# Patient Record
Sex: FEMALE | Race: WHITE | Employment: OTHER | URBAN - METROPOLITAN AREA
[De-identification: names, ages, dates, MRNs, and addresses within clinical notes are randomized per-mention and may not be internally consistent; named-entity substitution may affect disease eponyms.]

---

## 2018-07-31 ENCOUNTER — TRANSFERRED RECORDS (OUTPATIENT)
Dept: HEALTH INFORMATION MANAGEMENT | Facility: CLINIC | Age: 48
End: 2018-07-31

## 2018-08-09 ENCOUNTER — TRANSFERRED RECORDS (OUTPATIENT)
Dept: HEALTH INFORMATION MANAGEMENT | Facility: CLINIC | Age: 48
End: 2018-08-09

## 2018-08-10 ENCOUNTER — TRANSFERRED RECORDS (OUTPATIENT)
Dept: HEALTH INFORMATION MANAGEMENT | Facility: CLINIC | Age: 48
End: 2018-08-10

## 2018-08-10 ENCOUNTER — APPOINTMENT (OUTPATIENT)
Dept: GENERAL RADIOLOGY | Facility: CLINIC | Age: 48
DRG: 560 | End: 2018-08-10
Attending: ORTHOPAEDIC SURGERY
Payer: COMMERCIAL

## 2018-08-10 ENCOUNTER — HOSPITAL ENCOUNTER (INPATIENT)
Facility: CLINIC | Age: 48
LOS: 2 days | Discharge: HOME | DRG: 560 | End: 2018-08-12
Attending: ORTHOPAEDIC SURGERY | Admitting: ORTHOPAEDIC SURGERY
Payer: COMMERCIAL

## 2018-08-10 DIAGNOSIS — S73.005A DISLOCATION OF LEFT HIP, INITIAL ENCOUNTER (H): Primary | ICD-10-CM

## 2018-08-10 PROBLEM — S73.006A: Status: ACTIVE | Noted: 2018-08-10

## 2018-08-10 PROCEDURE — A9270 NON-COVERED ITEM OR SERVICE: HCPCS | Mod: GY | Performed by: STUDENT IN AN ORGANIZED HEALTH CARE EDUCATION/TRAINING PROGRAM

## 2018-08-10 PROCEDURE — 25000132 ZZH RX MED GY IP 250 OP 250 PS 637: Performed by: STUDENT IN AN ORGANIZED HEALTH CARE EDUCATION/TRAINING PROGRAM

## 2018-08-10 PROCEDURE — 25000128 H RX IP 250 OP 636: Performed by: STUDENT IN AN ORGANIZED HEALTH CARE EDUCATION/TRAINING PROGRAM

## 2018-08-10 PROCEDURE — 85027 COMPLETE CBC AUTOMATED: CPT | Performed by: STUDENT IN AN ORGANIZED HEALTH CARE EDUCATION/TRAINING PROGRAM

## 2018-08-10 PROCEDURE — 81025 URINE PREGNANCY TEST: CPT | Performed by: STUDENT IN AN ORGANIZED HEALTH CARE EDUCATION/TRAINING PROGRAM

## 2018-08-10 PROCEDURE — 36415 COLL VENOUS BLD VENIPUNCTURE: CPT | Performed by: STUDENT IN AN ORGANIZED HEALTH CARE EDUCATION/TRAINING PROGRAM

## 2018-08-10 PROCEDURE — 72170 X-RAY EXAM OF PELVIS: CPT

## 2018-08-10 PROCEDURE — 12000001 ZZH R&B MED SURG/OB UMMC

## 2018-08-10 RX ORDER — OXYCODONE HYDROCHLORIDE 5 MG/1
5-10 TABLET ORAL
Status: DISCONTINUED | OUTPATIENT
Start: 2018-08-10 | End: 2018-08-12 | Stop reason: HOSPADM

## 2018-08-10 RX ORDER — PROCHLORPERAZINE MALEATE 5 MG
10 TABLET ORAL EVERY 6 HOURS PRN
Status: DISCONTINUED | OUTPATIENT
Start: 2018-08-10 | End: 2018-08-12 | Stop reason: HOSPADM

## 2018-08-10 RX ORDER — ACETAMINOPHEN 325 MG/1
975 TABLET ORAL EVERY 8 HOURS
Status: DISCONTINUED | OUTPATIENT
Start: 2018-08-10 | End: 2018-08-12 | Stop reason: HOSPADM

## 2018-08-10 RX ORDER — DIAZEPAM 5 MG
5 TABLET ORAL EVERY 6 HOURS PRN
Status: DISCONTINUED | OUTPATIENT
Start: 2018-08-10 | End: 2018-08-12 | Stop reason: HOSPADM

## 2018-08-10 RX ORDER — KETOROLAC TROMETHAMINE 15 MG/ML
15 INJECTION, SOLUTION INTRAMUSCULAR; INTRAVENOUS EVERY 6 HOURS
Status: DISPENSED | OUTPATIENT
Start: 2018-08-10 | End: 2018-08-12

## 2018-08-10 RX ORDER — LIDOCAINE 40 MG/G
CREAM TOPICAL
Status: DISCONTINUED | OUTPATIENT
Start: 2018-08-10 | End: 2018-08-12 | Stop reason: HOSPADM

## 2018-08-10 RX ORDER — HYDROMORPHONE HYDROCHLORIDE 1 MG/ML
.3-.5 INJECTION, SOLUTION INTRAMUSCULAR; INTRAVENOUS; SUBCUTANEOUS
Status: DISCONTINUED | OUTPATIENT
Start: 2018-08-10 | End: 2018-08-12 | Stop reason: HOSPADM

## 2018-08-10 RX ORDER — ACETAMINOPHEN 325 MG/1
650 TABLET ORAL EVERY 4 HOURS PRN
Status: DISCONTINUED | OUTPATIENT
Start: 2018-08-13 | End: 2018-08-12 | Stop reason: HOSPADM

## 2018-08-10 RX ORDER — ONDANSETRON 4 MG/1
4 TABLET, ORALLY DISINTEGRATING ORAL EVERY 6 HOURS PRN
Status: DISCONTINUED | OUTPATIENT
Start: 2018-08-10 | End: 2018-08-12 | Stop reason: HOSPADM

## 2018-08-10 RX ORDER — HYDROXYZINE HYDROCHLORIDE 25 MG/1
25-50 TABLET, FILM COATED ORAL EVERY 6 HOURS PRN
Status: DISCONTINUED | OUTPATIENT
Start: 2018-08-10 | End: 2018-08-12 | Stop reason: HOSPADM

## 2018-08-10 RX ORDER — SODIUM CHLORIDE 9 MG/ML
INJECTION, SOLUTION INTRAVENOUS CONTINUOUS
Status: DISCONTINUED | OUTPATIENT
Start: 2018-08-10 | End: 2018-08-12 | Stop reason: HOSPADM

## 2018-08-10 RX ORDER — ONDANSETRON 2 MG/ML
4 INJECTION INTRAMUSCULAR; INTRAVENOUS EVERY 6 HOURS PRN
Status: DISCONTINUED | OUTPATIENT
Start: 2018-08-10 | End: 2018-08-12 | Stop reason: HOSPADM

## 2018-08-10 RX ORDER — CALCIUM CARBONATE 500 MG/1
1000 TABLET, CHEWABLE ORAL 4 TIMES DAILY PRN
Status: DISCONTINUED | OUTPATIENT
Start: 2018-08-10 | End: 2018-08-12 | Stop reason: HOSPADM

## 2018-08-10 RX ORDER — NALOXONE HYDROCHLORIDE 0.4 MG/ML
.1-.4 INJECTION, SOLUTION INTRAMUSCULAR; INTRAVENOUS; SUBCUTANEOUS
Status: DISCONTINUED | OUTPATIENT
Start: 2018-08-10 | End: 2018-08-12 | Stop reason: HOSPADM

## 2018-08-10 RX ADMIN — OXYCODONE HYDROCHLORIDE 5 MG: 5 TABLET ORAL at 23:34

## 2018-08-10 RX ADMIN — ACETAMINOPHEN 975 MG: 325 TABLET, FILM COATED ORAL at 20:21

## 2018-08-10 RX ADMIN — KETOROLAC TROMETHAMINE 15 MG: 15 INJECTION, SOLUTION INTRAMUSCULAR; INTRAVENOUS at 22:04

## 2018-08-10 RX ADMIN — HYDROMORPHONE HYDROCHLORIDE 0.5 MG: 1 INJECTION, SOLUTION INTRAMUSCULAR; INTRAVENOUS; SUBCUTANEOUS at 20:06

## 2018-08-10 ASSESSMENT — ACTIVITIES OF DAILY LIVING (ADL): ADLS_ACUITY_SCORE: 18

## 2018-08-10 NOTE — IP AVS SNAPSHOT
UR 8A    [address] [facility]    [facility] [address]    Phone:  [phone]                                       After Visit Summary   8/10/2018    Paulina Fonseca    MRN: [id number]           After Visit Summary Signature Page     I have received my discharge instructions, and my questions have been answered. I have discussed any challenges I see with this plan with the nurse or doctor.    ..........................................................................................................................................  Patient/Patient Representative Signature      ..........................................................................................................................................  Patient Representative Print Name and Relationship to Patient    ..................................................               ................................................  Date                                            Time    ..........................................................................................................................................  Reviewed by Signature/Title    ...................................................              ..............................................  Date                                                            Time

## 2018-08-10 NOTE — IP AVS SNAPSHOT
MRN:[id number]                      After Visit Summary   8/10/2018    Paulina Fonseca    MRN: [id number]           Thank you!     Thank you for choosing [facility] for your care. Our goal is always to provide you with excellent care. Hearing back from our patients is one way we can continue to improve our services. Please take a few minutes to complete the written survey that you may receive in the mail after you visit with us. Thank you!        Patient Information     Date Of Birth          1970        About your hospital stay     You were admitted on:  August 10, 2018 You last received care in the:  [facility] 8A    You were discharged on:  August 12, 2018        Reason for your hospital stay       Hip dislocation                  Who to Call     For medical emergencies, please call 911.  For non-urgent questions about your medical care, please call your primary care provider or clinic, None  For questions related to your surgery, please call your surgery clinic        Attending Provider     Provider Specialty    Kosta Pastrana MD Orthopedics       Primary Care Provider    None Specified      After Care Instructions     Activity       Your activity upon discharge: WBAT with assistive device; discuss with your primary surgeon what their recommendations would be for hip precautions.            Diet       Follow this diet upon discharge: Orders Placed This Encounter      Advance Diet as Tolerated: Regular Diet Adult                  Follow-up Appointments     Follow Up and recommended labs and tests       Contact your primary surgeon to plan your next follow up appointment.                  Additional Information     If you use hormonal birth control (such as the pill, patch, ring or implants): You'll need a second form of birth control for 7 days (condoms, a diaphragm or contraceptive foam). While in the hospital, you received a medicine called Bridion. Your normal birth control will not work as  "well for a week after taking this medicine.          Pending Results     Date and Time Order Name Status Description    2018 0600 EKG 12-lead, complete Preliminary             Statement of Approval     Ordered          18 1227  I have reviewed and agree with all the recommendations and orders detailed in this document.  EFFECTIVE NOW     Approved and electronically signed by:  Kosta Pastrana MD             Admission Information     Date & Time Provider Department Dept. Phone    8/10/2018 Kosta Pastrana MD UR 8A [phone]      Your Vitals Were     Blood Pressure Pulse Temperature Respirations Height Weight    106/59 (BP Location: Right arm) 72 98.4  F (36.9  C) (Oral) 16 1.539 m (5' 0.6\") 68 kg (150 lb)    Pulse Oximetry BMI (Body Mass Index)                94% 28.72 kg/m2          MyChart Information     [employer] lets you send messages to your doctor, view your test results, renew your prescriptions, schedule appointments and more. To sign up, go to [email or web address] . Click on \"Log in\" on the left side of the screen, which will take you to the Welcome page. Then click on \"Sign up Now\" on the right side of the page.     You will be asked to enter the access code listed below, as well as some personal information. Please follow the directions to create your username and password.     Your access code is: [email or web address]  Expires: 11/10/2018 12:20 PM     Your access code will  in 90 days. If you need help or a new code, please call your [facility] clinic or [phone].        Care EveryWhere ID     This is your Care EveryWhere ID. This could be used by other organizations to access your [facility] medical records  [id number]        Equal Access to Services     [facility]: Carmen Rock, frances dow, aura manningalmasamuel earl, sandy cleaning. So [facility] [phone].    ATENCIÓN: Si habla español, tiene a cook disposición servicios gratuitos de " asistencia lingüística. Tonja al [phone].    We comply with applicable federal civil rights laws and [place] laws. We do not discriminate on the basis of race, color, national origin, age, disability, sex, sexual orientation, or gender identity.               Review of your medicines      START taking        Dose / Directions    acetaminophen 325 MG tablet   Commonly known as:  TYLENOL        Dose:  650 mg   Start taking on:  8/13/2018   Take 2 tablets (650 mg) by mouth every 4 hours as needed for other (multimodal surgical pain management along with NSAIDS and opioid medication as indicated based on pain control and physical function.)   Quantity:  100 tablet   Refills:  0       oxyCODONE IR 5 MG tablet   Commonly known as:  ROXICODONE        Dose:  5-10 mg   Take 1-2 tablets (5-10 mg) by mouth every 4 hours as needed for other (pain control or improvement in physical function. Hold dose for analgesic side effects.)   Quantity:  40 tablet   Refills:  0         CONTINUE these medicines which have NOT CHANGED        Dose / Directions    DULOXETINE HCL PO        Dose:  60 mg   Take 60 mg by mouth daily   Refills:  0       * GABAPENTIN PO        Dose:  600 mg   Take 600 mg by mouth 2 times daily AM and Lunch time   Refills:  0       * GABAPENTIN PO        Dose:  900 mg   Take 900 mg by mouth At Bedtime   Refills:  0       MODAFINIL PO        Dose:  250 mg   Take 250 mg by mouth every morning   Refills:  0       PROTONIX PO        Dose:  40 mg   Take 40 mg by mouth every morning (before breakfast)   Refills:  0       REGLAN PO        Dose:  10 mg   Take 10 mg by mouth 3 times daily (before meals)   Refills:  0       ROPINIROLE HCL PO        Dose:  2 mg   Take 2 mg by mouth 3 times daily   Refills:  0       TIZANIDINE HCL PO        Dose:  4 mg   Take 4 mg by mouth every 8 hours as needed for muscle spasms   Refills:  0       * Notice:  This list has 2 medication(s) that are the same as other medications prescribed  for you. Read the directions carefully, and ask your doctor or other care provider to review them with you.      STOP taking     gabapentin 300 MG 24 hr tablet   Commonly known as:  GRALISE                Where to get your medicines      Some of these will need a paper prescription and others can be bought over the counter. Ask your nurse if you have questions.     Bring a paper prescription for each of these medications     acetaminophen 325 MG tablet    oxyCODONE IR 5 MG tablet                Protect others around you: Learn how to safely use, store and throw away your medicines at www.disposemymeds.org.        Information about OPIOIDS     PRESCRIPTION OPIOIDS: WHAT YOU NEED TO KNOW   We gave you an opioid (narcotic) pain medicine. It is important to manage your pain, but opioids are not always the best choice. You should first try all the other options your care team gave you. Take this medicine for as short a time (and as few doses) as possible.    Some activities can increase your pain, such as bandage changes or therapy sessions. It may help to take your pain medicine 30 to 60 minutes before these activities. Reduce your stress by getting enough sleep, working on hobbies you enjoy and practicing relaxation or meditation. Talk to your care team about ways to manage your pain beyond prescription opioids.    These medicines have risks:    DO NOT drive when on new or higher doses of pain medicine. These medicines can affect your alertness and reaction times, and you could be arrested for driving under the influence (DUI). If you need to use opioids long-term, talk to your care team about driving.    DO NOT operate heavy machinery    DO NOT do any other dangerous activities while taking these medicines.    DO NOT drink any alcohol while taking these medicines.     If the opioid prescribed includes acetaminophen, DO NOT take with any other medicines that contain acetaminophen. Read all labels carefully. Look for  the word  acetaminophen  or  Tylenol.  Ask your pharmacist if you have questions or are unsure.    You can get addicted to pain medicines, especially if you have a history of addiction (chemical, alcohol or substance dependence). Talk to your care team about ways to reduce this risk.    All opioids tend to cause constipation. Drink plenty of water and eat foods that have a lot of fiber, such as fruits, vegetables, prune juice, apple juice and high-fiber cereal. Take a laxative (Miralax, milk of magnesia, Colace, Senna) if you don t move your bowels at least every other day. Other side effects include upset stomach, sleepiness, dizziness, throwing up, tolerance (needing more of the medicine to have the same effect), physical dependence and slowed breathing.    Store your pills in a secure place, locked if possible. We will not replace any lost or stolen medicine. If you don t finish your medicine, please throw away (dispose) as directed by your pharmacist. The [place] Pollution Control [employer] has more information about safe disposal: https://www.pca.[facility].us/living-green/managing-unwanted-medications             Medication List: This is a list of all your medications and when to take them. Check marks below indicate your daily home schedule. Keep this list as a reference.      Medications           Morning Afternoon Evening Bedtime As Needed    acetaminophen 325 MG tablet   Commonly known as:  TYLENOL   Take 2 tablets (650 mg) by mouth every 4 hours as needed for other (multimodal surgical pain management along with NSAIDS and opioid medication as indicated based on pain control and physical function.)   Start taking on:  8/13/2018   Last time this was given:  975 mg on 8/12/2018  2:39 PM                                DULOXETINE HCL PO   Take 60 mg by mouth daily   Last time this was given:  60 mg on 8/12/2018  9:22 AM                                * GABAPENTIN PO   Take 600 mg by mouth 2 times daily AM and  Lunch time   Last time this was given:  600 mg on 8/12/2018  2:39 PM                                * GABAPENTIN PO   Take 900 mg by mouth At Bedtime   Last time this was given:  600 mg on 8/12/2018  2:39 PM                                MODAFINIL PO   Take 250 mg by mouth every morning   Last time this was given:  250 mg on 8/12/2018  9:22 AM                                oxyCODONE IR 5 MG tablet   Commonly known as:  ROXICODONE   Take 1-2 tablets (5-10 mg) by mouth every 4 hours as needed for other (pain control or improvement in physical function. Hold dose for analgesic side effects.)   Last time this was given:  10 mg on 8/12/2018  3:19 PM                                PROTONIX PO   Take 40 mg by mouth every morning (before breakfast)   Last time this was given:  40 mg on 8/12/2018  9:22 AM                                REGLAN PO   Take 10 mg by mouth 3 times daily (before meals)   Last time this was given:  10 mg on 8/12/2018 12:31 PM                                ROPINIROLE HCL PO   Take 2 mg by mouth 3 times daily   Last time this was given:  2 mg on 8/12/2018  9:22 AM                                TIZANIDINE HCL PO   Take 4 mg by mouth every 8 hours as needed for muscle spasms                                * Notice:  This list has 2 medication(s) that are the same as other medications prescribed for you. Read the directions carefully, and ask your doctor or other care provider to review them with you.

## 2018-08-11 ENCOUNTER — APPOINTMENT (OUTPATIENT)
Dept: GENERAL RADIOLOGY | Facility: CLINIC | Age: 48
DRG: 560 | End: 2018-08-11
Attending: ORTHOPAEDIC SURGERY
Payer: COMMERCIAL

## 2018-08-11 ENCOUNTER — ANESTHESIA (OUTPATIENT)
Dept: SURGERY | Facility: CLINIC | Age: 48
DRG: 560 | End: 2018-08-11
Payer: COMMERCIAL

## 2018-08-11 ENCOUNTER — ANESTHESIA EVENT (OUTPATIENT)
Dept: SURGERY | Facility: CLINIC | Age: 48
DRG: 560 | End: 2018-08-11
Payer: COMMERCIAL

## 2018-08-11 LAB
ABO + RH BLD: NORMAL
ANION GAP SERPL CALCULATED.3IONS-SCNC: 5 MMOL/L (ref 3–14)
BLD GP AB SCN SERPL QL: NORMAL
BLOOD BANK CMNT PATIENT-IMP: NORMAL
BUN SERPL-MCNC: 17 MG/DL (ref 7–30)
CALCIUM SERPL-MCNC: 7.9 MG/DL (ref 8.5–10.1)
CHLORIDE SERPL-SCNC: 114 MMOL/L (ref 94–109)
CO2 SERPL-SCNC: 27 MMOL/L (ref 20–32)
CREAT SERPL-MCNC: 0.54 MG/DL (ref 0.52–1.04)
ERYTHROCYTE [DISTWIDTH] IN BLOOD BY AUTOMATED COUNT: 23.6 % (ref 10–15)
GFR SERPL CREATININE-BSD FRML MDRD: >90 ML/MIN/1.7M2
GLUCOSE SERPL-MCNC: 83 MG/DL (ref 70–99)
HCG UR QL: NEGATIVE
HCT VFR BLD AUTO: 32.7 % (ref 35–47)
HGB BLD-MCNC: 9.1 G/DL (ref 11.7–15.7)
HGB BLD-MCNC: 9.6 G/DL (ref 11.7–15.7)
INR PPP: 0.97 (ref 0.86–1.14)
INTERNAL QC OK POCT: YES
MCH RBC QN AUTO: 22.6 PG (ref 26.5–33)
MCHC RBC AUTO-ENTMCNC: 27.8 G/DL (ref 31.5–36.5)
MCV RBC AUTO: 81 FL (ref 78–100)
PLATELET # BLD AUTO: 201 10E9/L (ref 150–450)
POTASSIUM SERPL-SCNC: 4.1 MMOL/L (ref 3.4–5.3)
RBC # BLD AUTO: 4.02 10E12/L (ref 3.8–5.2)
SODIUM SERPL-SCNC: 146 MMOL/L (ref 133–144)
SPECIMEN EXP DATE BLD: NORMAL
SPECIMEN EXP DATE BLD: NORMAL
WBC # BLD AUTO: 8.5 10E9/L (ref 4–11)

## 2018-08-11 PROCEDURE — A9270 NON-COVERED ITEM OR SERVICE: HCPCS | Mod: GY | Performed by: HOSPITALIST

## 2018-08-11 PROCEDURE — 25000132 ZZH RX MED GY IP 250 OP 250 PS 637: Mod: GY | Performed by: ANESTHESIOLOGY

## 2018-08-11 PROCEDURE — 25000132 ZZH RX MED GY IP 250 OP 250 PS 637: Mod: GY | Performed by: STUDENT IN AN ORGANIZED HEALTH CARE EDUCATION/TRAINING PROGRAM

## 2018-08-11 PROCEDURE — 40000278 XR SURGERY CARM FLUORO LESS THAN 5 MIN: Mod: TC

## 2018-08-11 PROCEDURE — A9270 NON-COVERED ITEM OR SERVICE: HCPCS | Mod: GY | Performed by: STUDENT IN AN ORGANIZED HEALTH CARE EDUCATION/TRAINING PROGRAM

## 2018-08-11 PROCEDURE — 85018 HEMOGLOBIN: CPT | Performed by: STUDENT IN AN ORGANIZED HEALTH CARE EDUCATION/TRAINING PROGRAM

## 2018-08-11 PROCEDURE — A9270 NON-COVERED ITEM OR SERVICE: HCPCS | Mod: GY | Performed by: ANESTHESIOLOGY

## 2018-08-11 PROCEDURE — 93005 ELECTROCARDIOGRAM TRACING: CPT

## 2018-08-11 PROCEDURE — 80048 BASIC METABOLIC PNL TOTAL CA: CPT | Performed by: STUDENT IN AN ORGANIZED HEALTH CARE EDUCATION/TRAINING PROGRAM

## 2018-08-11 PROCEDURE — 99231 SBSQ HOSP IP/OBS SF/LOW 25: CPT | Performed by: HOSPITALIST

## 2018-08-11 PROCEDURE — 36000055 ZZH SURGERY LEVEL 2 W FLUORO 1ST 30 MIN - UMMC: Performed by: ORTHOPAEDIC SURGERY

## 2018-08-11 PROCEDURE — 36000066 ZZH SURGERY LEVEL 4 W FLUORO 1ST 30 MIN - UMMC: Performed by: ORTHOPAEDIC SURGERY

## 2018-08-11 PROCEDURE — 37000009 ZZH ANESTHESIA TECHNICAL FEE, EACH ADDTL 15 MIN: Performed by: ORTHOPAEDIC SURGERY

## 2018-08-11 PROCEDURE — 25000566 ZZH SEVOFLURANE, EA 15 MIN: Performed by: ORTHOPAEDIC SURGERY

## 2018-08-11 PROCEDURE — 12000001 ZZH R&B MED SURG/OB UMMC

## 2018-08-11 PROCEDURE — 85610 PROTHROMBIN TIME: CPT | Performed by: STUDENT IN AN ORGANIZED HEALTH CARE EDUCATION/TRAINING PROGRAM

## 2018-08-11 PROCEDURE — 86901 BLOOD TYPING SEROLOGIC RH(D): CPT | Performed by: ANESTHESIOLOGY

## 2018-08-11 PROCEDURE — 25000125 ZZHC RX 250: Performed by: STUDENT IN AN ORGANIZED HEALTH CARE EDUCATION/TRAINING PROGRAM

## 2018-08-11 PROCEDURE — 25000125 ZZHC RX 250: Performed by: NURSE ANESTHETIST, CERTIFIED REGISTERED

## 2018-08-11 PROCEDURE — 25000128 H RX IP 250 OP 636: Performed by: ANESTHESIOLOGY

## 2018-08-11 PROCEDURE — 0SWBXJZ REVISION OF SYNTHETIC SUBSTITUTE IN LEFT HIP JOINT, EXTERNAL APPROACH: ICD-10-PCS | Performed by: ORTHOPAEDIC SURGERY

## 2018-08-11 PROCEDURE — 25000128 H RX IP 250 OP 636: Performed by: STUDENT IN AN ORGANIZED HEALTH CARE EDUCATION/TRAINING PROGRAM

## 2018-08-11 PROCEDURE — 86900 BLOOD TYPING SEROLOGIC ABO: CPT | Performed by: ANESTHESIOLOGY

## 2018-08-11 PROCEDURE — 93010 ELECTROCARDIOGRAM REPORT: CPT | Performed by: INTERNAL MEDICINE

## 2018-08-11 PROCEDURE — 25000132 ZZH RX MED GY IP 250 OP 250 PS 637: Performed by: HOSPITALIST

## 2018-08-11 PROCEDURE — 86850 RBC ANTIBODY SCREEN: CPT | Performed by: ANESTHESIOLOGY

## 2018-08-11 PROCEDURE — 40000170 ZZH STATISTIC PRE-PROCEDURE ASSESSMENT II: Performed by: ORTHOPAEDIC SURGERY

## 2018-08-11 PROCEDURE — 71000014 ZZH RECOVERY PHASE 1 LEVEL 2 FIRST HR: Performed by: ORTHOPAEDIC SURGERY

## 2018-08-11 PROCEDURE — 25000128 H RX IP 250 OP 636: Performed by: NURSE ANESTHETIST, CERTIFIED REGISTERED

## 2018-08-11 PROCEDURE — 37000008 ZZH ANESTHESIA TECHNICAL FEE, 1ST 30 MIN: Performed by: ORTHOPAEDIC SURGERY

## 2018-08-11 PROCEDURE — 36415 COLL VENOUS BLD VENIPUNCTURE: CPT | Performed by: STUDENT IN AN ORGANIZED HEALTH CARE EDUCATION/TRAINING PROGRAM

## 2018-08-11 PROCEDURE — C9399 UNCLASSIFIED DRUGS OR BIOLOG: HCPCS | Performed by: NURSE ANESTHETIST, CERTIFIED REGISTERED

## 2018-08-11 RX ORDER — TIZANIDINE 2 MG/1
4 TABLET ORAL EVERY 8 HOURS PRN
Status: DISCONTINUED | OUTPATIENT
Start: 2018-08-11 | End: 2018-08-12 | Stop reason: HOSPADM

## 2018-08-11 RX ORDER — GABAPENTIN 600 MG/1
600 TABLET ORAL 3 TIMES DAILY
Status: DISCONTINUED | OUTPATIENT
Start: 2018-08-11 | End: 2018-08-12

## 2018-08-11 RX ORDER — FENTANYL CITRATE 50 UG/ML
25-50 INJECTION, SOLUTION INTRAMUSCULAR; INTRAVENOUS
Status: DISCONTINUED | OUTPATIENT
Start: 2018-08-11 | End: 2018-08-11 | Stop reason: HOSPADM

## 2018-08-11 RX ORDER — HYDROMORPHONE HYDROCHLORIDE 1 MG/ML
.3-.5 INJECTION, SOLUTION INTRAMUSCULAR; INTRAVENOUS; SUBCUTANEOUS EVERY 5 MIN PRN
Status: DISCONTINUED | OUTPATIENT
Start: 2018-08-11 | End: 2018-08-11 | Stop reason: HOSPADM

## 2018-08-11 RX ORDER — NALOXONE HYDROCHLORIDE 0.4 MG/ML
.1-.4 INJECTION, SOLUTION INTRAMUSCULAR; INTRAVENOUS; SUBCUTANEOUS
Status: ACTIVE | OUTPATIENT
Start: 2018-08-11 | End: 2018-08-12

## 2018-08-11 RX ORDER — METOCLOPRAMIDE 10 MG/1
10 TABLET ORAL
Status: DISCONTINUED | OUTPATIENT
Start: 2018-08-12 | End: 2018-08-11

## 2018-08-11 RX ORDER — SODIUM CHLORIDE, SODIUM LACTATE, POTASSIUM CHLORIDE, CALCIUM CHLORIDE 600; 310; 30; 20 MG/100ML; MG/100ML; MG/100ML; MG/100ML
INJECTION, SOLUTION INTRAVENOUS CONTINUOUS
Status: DISCONTINUED | OUTPATIENT
Start: 2018-08-11 | End: 2018-08-11 | Stop reason: HOSPADM

## 2018-08-11 RX ORDER — GABAPENTIN 600 MG/1
600 TABLET, FILM COATED ORAL 2 TIMES DAILY
Status: DISCONTINUED | OUTPATIENT
Start: 2018-08-11 | End: 2018-08-11

## 2018-08-11 RX ORDER — GABAPENTIN 300 MG/1
900 CAPSULE ORAL ONCE
Status: COMPLETED | OUTPATIENT
Start: 2018-08-11 | End: 2018-08-11

## 2018-08-11 RX ORDER — ROPINIROLE 2 MG/1
2 TABLET, FILM COATED ORAL 3 TIMES DAILY
Status: DISCONTINUED | OUTPATIENT
Start: 2018-08-11 | End: 2018-08-12 | Stop reason: HOSPADM

## 2018-08-11 RX ORDER — LIDOCAINE HYDROCHLORIDE 20 MG/ML
INJECTION, SOLUTION INFILTRATION; PERINEURAL PRN
Status: DISCONTINUED | OUTPATIENT
Start: 2018-08-11 | End: 2018-08-11

## 2018-08-11 RX ORDER — PANTOPRAZOLE SODIUM 40 MG/1
40 TABLET, DELAYED RELEASE ORAL
Status: DISCONTINUED | OUTPATIENT
Start: 2018-08-12 | End: 2018-08-12 | Stop reason: HOSPADM

## 2018-08-11 RX ORDER — DULOXETIN HYDROCHLORIDE 60 MG/1
60 CAPSULE, DELAYED RELEASE ORAL DAILY
Status: DISCONTINUED | OUTPATIENT
Start: 2018-08-12 | End: 2018-08-12 | Stop reason: HOSPADM

## 2018-08-11 RX ORDER — FENTANYL CITRATE 50 UG/ML
INJECTION, SOLUTION INTRAMUSCULAR; INTRAVENOUS PRN
Status: DISCONTINUED | OUTPATIENT
Start: 2018-08-11 | End: 2018-08-11

## 2018-08-11 RX ORDER — ONDANSETRON 4 MG/1
4 TABLET, ORALLY DISINTEGRATING ORAL EVERY 30 MIN PRN
Status: DISCONTINUED | OUTPATIENT
Start: 2018-08-11 | End: 2018-08-11 | Stop reason: HOSPADM

## 2018-08-11 RX ORDER — PROPOFOL 10 MG/ML
INJECTION, EMULSION INTRAVENOUS PRN
Status: DISCONTINUED | OUTPATIENT
Start: 2018-08-11 | End: 2018-08-11

## 2018-08-11 RX ORDER — GABAPENTIN 300 MG/1
600 TABLET, FILM COATED ORAL 2 TIMES DAILY
Status: ON HOLD | COMMUNITY
End: 2018-08-12

## 2018-08-11 RX ORDER — ONDANSETRON 2 MG/ML
4 INJECTION INTRAMUSCULAR; INTRAVENOUS EVERY 30 MIN PRN
Status: DISCONTINUED | OUTPATIENT
Start: 2018-08-11 | End: 2018-08-11 | Stop reason: HOSPADM

## 2018-08-11 RX ORDER — SODIUM CHLORIDE, SODIUM LACTATE, POTASSIUM CHLORIDE, CALCIUM CHLORIDE 600; 310; 30; 20 MG/100ML; MG/100ML; MG/100ML; MG/100ML
INJECTION, SOLUTION INTRAVENOUS CONTINUOUS PRN
Status: DISCONTINUED | OUTPATIENT
Start: 2018-08-11 | End: 2018-08-11

## 2018-08-11 RX ORDER — METOCLOPRAMIDE 10 MG/1
10 TABLET ORAL
Status: DISCONTINUED | OUTPATIENT
Start: 2018-08-11 | End: 2018-08-12 | Stop reason: HOSPADM

## 2018-08-11 RX ADMIN — Medication 0.3 MG: at 09:08

## 2018-08-11 RX ADMIN — HYDROMORPHONE HYDROCHLORIDE 0.5 MG: 1 INJECTION, SOLUTION INTRAMUSCULAR; INTRAVENOUS; SUBCUTANEOUS at 06:10

## 2018-08-11 RX ADMIN — KETOROLAC TROMETHAMINE 15 MG: 15 INJECTION, SOLUTION INTRAMUSCULAR; INTRAVENOUS at 23:57

## 2018-08-11 RX ADMIN — GABAPENTIN 600 MG: 600 TABLET, FILM COATED ORAL at 23:56

## 2018-08-11 RX ADMIN — ACETAMINOPHEN 975 MG: 325 TABLET, FILM COATED ORAL at 20:52

## 2018-08-11 RX ADMIN — OXYCODONE HYDROCHLORIDE 5 MG: 5 TABLET ORAL at 13:15

## 2018-08-11 RX ADMIN — SODIUM CHLORIDE, POTASSIUM CHLORIDE, SODIUM LACTATE AND CALCIUM CHLORIDE: 600; 310; 30; 20 INJECTION, SOLUTION INTRAVENOUS at 07:59

## 2018-08-11 RX ADMIN — LIDOCAINE HYDROCHLORIDE 100 MG: 20 INJECTION, SOLUTION INFILTRATION; PERINEURAL at 08:06

## 2018-08-11 RX ADMIN — ROCURONIUM BROMIDE 50 MG: 10 INJECTION INTRAVENOUS at 08:06

## 2018-08-11 RX ADMIN — ACETAMINOPHEN 975 MG: 325 TABLET, FILM COATED ORAL at 03:50

## 2018-08-11 RX ADMIN — Medication 0.5 MG: at 08:43

## 2018-08-11 RX ADMIN — GABAPENTIN 900 MG: 300 CAPSULE ORAL at 07:47

## 2018-08-11 RX ADMIN — SUGAMMADEX 270 MG: 100 INJECTION, SOLUTION INTRAVENOUS at 08:25

## 2018-08-11 RX ADMIN — OXYCODONE HYDROCHLORIDE 10 MG: 5 TABLET ORAL at 20:52

## 2018-08-11 RX ADMIN — MIDAZOLAM 2 MG: 1 INJECTION INTRAMUSCULAR; INTRAVENOUS at 07:59

## 2018-08-11 RX ADMIN — ACETAMINOPHEN 975 MG: 325 TABLET, FILM COATED ORAL at 13:15

## 2018-08-11 RX ADMIN — ROPINIROLE HYDROCHLORIDE 2 MG: 2 TABLET, FILM COATED ORAL at 21:34

## 2018-08-11 RX ADMIN — OXYCODONE HYDROCHLORIDE 5 MG: 5 TABLET ORAL at 03:50

## 2018-08-11 RX ADMIN — OXYCODONE HYDROCHLORIDE 10 MG: 5 TABLET ORAL at 17:16

## 2018-08-11 RX ADMIN — OXYCODONE HYDROCHLORIDE 10 MG: 5 TABLET ORAL at 23:56

## 2018-08-11 RX ADMIN — FENTANYL CITRATE 100 MCG: 50 INJECTION, SOLUTION INTRAMUSCULAR; INTRAVENOUS at 08:06

## 2018-08-11 RX ADMIN — PROPOFOL 150 MG: 10 INJECTION, EMULSION INTRAVENOUS at 08:06

## 2018-08-11 RX ADMIN — ONDANSETRON 4 MG: 2 INJECTION INTRAMUSCULAR; INTRAVENOUS at 08:30

## 2018-08-11 RX ADMIN — KETOROLAC TROMETHAMINE 15 MG: 15 INJECTION, SOLUTION INTRAMUSCULAR; INTRAVENOUS at 03:50

## 2018-08-11 RX ADMIN — ONDANSETRON 4 MG: 4 TABLET, ORALLY DISINTEGRATING ORAL at 21:34

## 2018-08-11 RX ADMIN — KETOROLAC TROMETHAMINE 15 MG: 15 INJECTION, SOLUTION INTRAMUSCULAR; INTRAVENOUS at 17:20

## 2018-08-11 ASSESSMENT — ACTIVITIES OF DAILY LIVING (ADL)
ADLS_ACUITY_SCORE: 16
ADLS_ACUITY_SCORE: 18
ADLS_ACUITY_SCORE: 18
ADLS_ACUITY_SCORE: 16
ADLS_ACUITY_SCORE: 17

## 2018-08-11 ASSESSMENT — LIFESTYLE VARIABLES: TOBACCO_USE: 0

## 2018-08-11 NOTE — ANESTHESIA CARE TRANSFER NOTE
Patient: Paulina Fonseca    Procedure(s):  Open Versus Closed Left Hip Reduction - Wound Class: I-Clean    Diagnosis: Left Hip Replacement Dislocation  Diagnosis Additional Information: No value filed.    Anesthesia Type:   General, LMA     Note:      Comments: RR 20, Temp 37.1,       Vitals: (Last set prior to Anesthesia Care Transfer)    CRNA VITALS  8/11/2018 0802 - 8/11/2018 0836      8/11/2018             Pulse: 79    Ht Rate: 79    SpO2: 99 %    Resp Rate (observed): (!)  7    EKG: Sinus rhythm                Electronically Signed By: ELIZABETH Pineda CRNA  August 11, 2018  8:36 AM

## 2018-08-11 NOTE — PROGRESS NOTES
[facility], [place]    Hospitalist Progress Note    Date of Service (when I saw the patient): 08/11/2018    Assessment & Plan     Prosthetic Hip Dislocation SP closed reduction in OR under general anesthesia. Doing well. Plan per ortho     Restless leg syndrome  -  Resume home medications once verified (Ropinirole and Reglan for nausea)  -  Obtain iron studies  Chronic pain  - continue neurontin, cymbalta, tizanidine  - PT/OT consult obtained by primary service     Continue Modafinil as before. Not sure why she takes that.     DVT Prophylaxis: Defer to primary service  Code Status: Full Code    Disposition: tomorrow if does okay with PT.    Valeria Smalls MD    Interval History   Doing well, pain controlled. No chest pain or sob.    -Data reviewed today: I reviewed all new labs and imaging results over the last 24 hours. I personally reviewed no images or EKG's today.    Physical Exam   Temp: 97.5  F (36.4  C) Temp src: Oral BP: 99/53 Pulse: 72 Heart Rate: 76 Resp: 18 SpO2: 94 % O2 Device: Nasal cannula Oxygen Delivery: 2 LPM  Vitals:    08/11/18 0710   Weight: 68 kg (150 lb)     Vital Signs with Ranges  Temp:  [97.5  F (36.4  C)-98.8  F (37.1  C)] 97.5  F (36.4  C)  Pulse:  [62-78] 72  Heart Rate:  [73-76] 76  Resp:  [13-23] 18  BP: ()/(45-71) 99/53  SpO2:  [91 %-99 %] 94 %  I/O last 3 completed shifts:  In: 287 [I.V.:287]  Out: 660 [Urine:660]    Constitutional: Awake, alert, cooperative, no apparent distress  Respiratory: Clear to auscultation bilaterally, no crackles or wheezing  Cardiovascular: Regular rate and rhythm, normal S1 and S2, and no murmur noted  GI: Normal bowel sounds, soft, non-distended, non-tender  Skin/Integumen: No rashes, no cyanosis, no edema      Medications     sodium chloride         acetaminophen  975 mg Oral Q8H     ketorolac  15 mg Intravenous Q6H     sodium chloride (PF)  3 mL Intracatheter Q8H       Data     Recent Labs  Lab 08/11/18  0570  08/10/18  2352   WBC  --  8.5   HGB 9.6* 9.1*   MCV  --  81   PLT  --  201   INR 0.97  --    *  --    POTASSIUM 4.1  --    CHLORIDE 114*  --    CO2 27  --    BUN 17  --    CR 0.54  --    ANIONGAP 5  --    MARK 7.9*  --    GLC 83  --        Recent Results (from the past 24 hour(s))   XR Pelvis Port 1/2 Views    Narrative    History: Hip dislocation.    COMPARISON: None.    FINDINGS: There is dislocation of the left total hip arthroplasty with  cephalad displacement of the proximal femur and femoral component. No  acute fracture, right hip arthroplasty dislocation or other radiopaque  foreign body about the pelvis and visualized hips. Bones are  osteopenic.      Impression    IMPRESSION: Dislocated left total hip arthroplasty.    LUKE COMBS MD   XR Surgery LORENZO Fluoro L/T 5 Min    Narrative    This exam was marked as non-reportable because it will not be read by a   radiologist or a [facility] non-radiologist provider.

## 2018-08-11 NOTE — PLAN OF CARE
Problem: Pain, Acute (Adult)  Goal: Identify Related Risk Factors and Signs and Symptoms  Related risk factors and signs and symptoms are identified upon initiation of Human Response Clinical Practice Guideline (CPG).   Outcome: No Change    VS: VSS   O2: >90% on RA   Output: Pt voiding via lopez catheter, dark and concentrated, odorous.    Last BM: LBM 8/9 per pt report, passing flatus and BS active.    Activity: NWB on LLE, bedrest until surgery. Abduction pillow to remain in place.    Skin: Visible skin intact, refused to turn for full skin assessment. Bruises noted.    Pain: Pain is being managed with 1x dose of IV dilaudid when pt got XRAY of pelvis/hip. Also given scheduled toradol. Oxycodone 5-10 mg Q3H and atarax 25-50 mg available for pt. Also given scheduled tylenol. Pt reports sharp constant pain in L hip and reports pain in chest from compressions.    CMS: CMS intact, denies N/T in all extremities. DP +2 bilaterally, able to wiggle toes.    Dressing: N/A   Diet: Ate PB toast, applesauce, and lizette crackers without N/V. Will be NPO at [place] for possible surgery tomorrow.    LDA: PIV WDL and SL, added extension to existing PIV   Equipment: Abduction pillow, personal belongings at bedside.    Plan: Continue to monitor patient and manage pain. Plan for possible surgery tomorrow.   Additional Info: Internal medicine still needs to see pt for medical history and clearance for pre-op.   Pre-op checklist started in anticipation for surgery tomorrow.   Pt denies suicidal ideations, states she feels safe and that this was not a suicide attempt.

## 2018-08-11 NOTE — PHARMACY-ADMISSION MEDICATION HISTORY
Admission medication history interview status for the 8/10/2018 admission is complete.   See Epic admission navigator for allergy information, pharmacy, prior to admission medications and immunization status.     Medication history interview sources:    Lesley Hale. [place]  Tel #: [phone]  The patient was able to confirm that she was indeed taking the medications as told to writer by the pharmacy.      Patient Medication Schedule Preference  The patient takes metroclopromide at the same time as her Tizanidine dose    Patient Supplied Medications  None-all her meds are formulary    Additional medication history information (including reliability of information, actions taken by pharmacist):  -Protonix and Duloxetine are new prescriptions for the patient. First dispensed on 8/7/18 per her pharmacy        Prior to Admission medications    Medication Sig Last Dose Taking? Auth Provider   DULOXETINE HCL PO Take 60 mg by mouth daily Past Week at Unknown time Yes Unknown, Entered By History   GABAPENTIN PO Take 600 mg by mouth 2 times daily AM and Lunch time Past Week at Unknown time Yes Unknown, Entered By History   Metoclopramide HCl (REGLAN PO) Take 10 mg by mouth 3 times daily (before meals) Past Week at Unknown time Yes Unknown, Entered By History   MODAFINIL PO Take 250 mg by mouth every morning Past Week at Unknown time Yes Unknown, Entered By History   Pantoprazole Sodium (PROTONIX PO) Take 40 mg by mouth every morning (before breakfast) Past Week at Unknown time Yes Unknown, Entered By History   TIZANIDINE HCL PO Take 4 mg by mouth every 8 hours as needed for muscle spasms Past Week at Unknown time Yes Unknown, Entered By History   gabapentin (GRALISE) 300 MG 24 hr tablet Take 600 mg by mouth 2 times daily AM and Lunch time Past Week at Unknown time Yes Unknown, Entered By History   ROPINIROLE HCL PO Take 2 mg by mouth 3 times daily Past Week at Unknown time Yes Unknown, Entered By History          Medication history completed by:  Flori MurdockD,BCPS  August 11, 2018

## 2018-08-11 NOTE — PLAN OF CARE
Problem: Patient Care Overview  Goal: Plan of Care/Patient Progress Review  Outcome: Improving   Pt came back from surgery about 09:45 am,A/Ox's 4. Pain rated manageable with discomfort, oxycodone po PRN given for pain control. Dressing CDI. CMS intact. Tolerated regular diet. Denied any nausea, CP, SOB, lightheadedness or dizziness, lopez patent.Encouraged increased/continued IS use. Bilateral heels elevated of bed. Ice maintained to hip. Resting in bed at this time with call light in reach. Able to make needs known. Continue to monitor.      Up to BSC and bathroom with assist of one and walker large BM x one this evening.

## 2018-08-11 NOTE — ANESTHESIA POSTPROCEDURE EVALUATION
Patient: Paulina Fonseca    Procedure(s):  Open Versus Closed Left Hip Reduction - Wound Class: I-Clean    Diagnosis:Left Hip Replacement Dislocation  Diagnosis Additional Information: No value filed.    Anesthesia Type:  General, LMA    Note:  Anesthesia Post Evaluation    Patient location during evaluation: Phase 2  Patient participation: Able to fully participate in evaluation  Level of consciousness: awake and alert  Pain management: adequate  Airway patency: patent  Cardiovascular status: acceptable  Respiratory status: acceptable  Hydration status: acceptable  PONV: none     Anesthetic complications: None          Last vitals:  Vitals:    08/11/18 0920 08/11/18 0948 08/11/18 1000   BP:  101/56 100/45   Pulse:  66 62   Resp:  16 18   Temp:  36.4  C (97.5  F)    SpO2: 98%           Electronically Signed By: Justin Ferrari DO  August 11, 2018  11:28 AM

## 2018-08-11 NOTE — PROGRESS NOTES
Orthopaedic Surgery Progress Note  August 11, 2018    Subjective: No acute events overnight. Pain controlled.    Objective: /68 (BP Location: Right arm)  Pulse 75  Temp 97.9  F (36.6  C) (Oral)  Resp 16  SpO2 99%    Physical Exam:  General: alert, no distress  Respiratory: Breathing not labored.  MSK:  Focused examination of:   LLE: Toes WWP with BCR, + DP pulse. Fires EHL/FHL/GSC/TA. SILT SP/DP/Sa/Ruff/T.      Recent Labs  Lab 08/11/18  0532 08/10/18  2352   HGB 9.6* 9.1*   WBC  --  8.5     All cultures:  No results for input(s): CULT in the last 168 hours.    Assessment:  Paulina Fonseca is a 48 year old female with a PMH of  Sticklers Syndrome, who sustained left periprosthetic hip dislocation on 8/9. Hip was unable to be reduced at an outside hospital so the patient was transferred here.     Orthopaedic Injuries:  1. Left dislocation periprosthetic hip     Anticipated Procedure: closed vs open KAILEY                          -Consent: pending                          -Pre-op labs: done                          -Medicine clearance: pending                          -OR orders: done     Orthopaedics Primary; medicine co-managing  Activity: bedrest  Weight bearing status: NWB LLE  Antibiotics: none at this time  Diet: NPO  DVT prophylaxis: none at this time  Bracing/Splinting: abduction pillow  Drains: none  Physical Therapy/Occupational Therapy: Eval and treat  Consults: medicine     Follow-up: pending clinical course     Disposition: pending clinical course    Monroe Mims MD  Orthopaedic Resident, PGY-4  Pager: [phone]

## 2018-08-11 NOTE — BRIEF OP NOTE
[facility], [place]    Brief Operative Note    Pre-operative diagnosis: Left Hip Replacement Dislocation  Post-operative diagnosis Same  Procedure: Procedure(s): Closed Left Hip Reduction - Wound Class: I-Clean  Surgeon: Surgeon(s) and Role:     * Kosta Pastrana MD - Primary     * Dre Franz MD - Resident - Assisting     * Mike Ying MD - Resident - Assisting  Anesthesia: General   Estimated blood loss: None  Drains: None  Specimens: * No specimens in log *  Findings:   Anterior hip dislocation, reduced without difficulty   Complications: None.  Implants: See op note     Ortho Primary  Activity: With assistive device   Weight bearing status: WBAT LLE   Pain management: PO as tolerated    Antibiotics: None   Diet: Begin with clear fluids and progress diet as tolerated.   DVT prophylaxis: ambulation   Imaging: none   Labs: none   Bracing/Splinting: none   Physical Therapy/Occupational Therapy: Eval and treat.  Consults: Hospitalist.  Follow-up: With primary surgeon back home   Disposition: ok to discharge when mobilizing and pain controlled.     Kishore Masterson MD  Orthopaedic Surgery, PGY-4  Pager: [phone]

## 2018-08-11 NOTE — H&P
[facility]  ORTHOPAEDIC SURGERY CONSULT - HISTORY AND PHYSICAL      CC: left leg pain    DATE OF INJURY: 8/9/18    HISTORY OF PRESENT ILLNESS:   Paulina Fonseca is a 48 year old female with a medical history of Sticklers Syndrome who presents to [facility] as a transfer from [place] for a left prosthetic hip dislocation. She was helping her mother clean her house. She reportedly took several oxycodone tablets to help her get through cleaning. She subsequently fell onto her left hip and was unable to weight bear. She presented to a hospital in [place]. While there, orthopaedics reportedly tried to reduce her hip several times under anesthesia, but were unable to reduce it. She was subsequently transferred here for care of her left hip.  She has had bilateral hip, knee and ankle replacements.  At rest her pain is controlled.    Left KAILEY  DePuy hip   54mm acetabulum  36 liner  36mm ceramic head    PAST MEDICAL HISTORY:   No past medical history on file.    Patient denies any personal history of bleeding disorders, clotting disorders, or adverse reactions to anesthesia.    PAST SURGICAL HISTORY:    No past surgical history on file.    MEDICATIONS:  Prior to Admission medications    Not on File         ALLERGIES:   Review of patient's allergies indicates not on file.    SOCIAL HISTORY:   Tobacco: no  EtOH: yes  Street drugs: denies  Work: none  Living situation: apt   Ambulatory status:  full    Social History     Social History     Marital status: N/A     Spouse name: N/A     Number of children: N/A     Years of education: N/A     Occupational History     Not on file.     Social History Main Topics     Smoking status: Not on file     Smokeless tobacco: Not on file     Alcohol use Not on file     Drug use: Not on file     Sexual activity: Not on file     Other Topics Concern     Not on file     Social History Narrative       FAMILY HISTORY:  No family history on file.      Patient denies known  family history of bleeding, clotting, or anesthesia related complications.     REVIEW OF SYSTEMS:   10-point reviews of systems was negative except as noted above in the HPI.    PHYSICAL EXAM:   Vitals:    08/10/18 1830   BP: 122/70   BP Location: Right arm   Pulse: 72   Resp: 16   Temp: 97.7  F (36.5  C)   TempSrc: Oral   SpO2: 92%     General: Awake, alert, appropriate, following commands, NAD.  Psych: Normal affect, nonpressured speech  Skin: No rashes,  skin color normal.  HEENT: Normal.   Lungs: Breathing comfortably and nonlabored, no wheezes or stridor noted.  Heart/Cardiovascular: Regular pulse by peripheral exam, no peripheral cyanosis.  Abdomen: Non-distended.       Left Lower Extremity:   - No gross deformity. Skin intact. Abduction pillow in place.  - No significant tenderness to palpation over thigh, knee, leg, ankle, foot, toes.   - Motor: fires TA/GSC/EHL/FHL.  - Neuro: SILT sp/dp/tibial/saph/sural nerves.   - Vascular: Palpable DP/PT pulses, 2+, toes warm and well perfused.       LABS:  pending     IMAGING:  XR: dislocation of the left hip. No fractures seen      IMPRESSION:   Paulina Fonseca is a 48 year old female with a PMH of  Sticklers Syndrome, who sustained left periprosthetic hip dislocation on 8/9. Hip was unable to be reduced at an outside hospital so the patient was transferred here.    Orthopaedic Injuries:  1. Left dislocation periprosthetic hip    Anticipated Procedure: closed vs open KAILEY   -Consent: pending   -Pre-op labs: done   -Medicine clearance: pending   -OR orders: done    Orthopaedics Primary; medicine co-managing  Activity: bedrest  Weight bearing status: NWB LLE  Antibiotics: none at this time  Diet: NPO midnight  DVT prophylaxis: none at this time  Bracing/Splinting: abduction pillow  Drains: none  Physical Therapy/Occupational Therapy: Eval and treat  Consults: medicine    Follow-up: pending clinical course    Disposition: pending clinical course    Assessment and Plan will  be discussed with Dr. Campo, Orthopaedic Surgery    Monroe Mims MD  Orthopaedic Resident, PGY-4  [phone]

## 2018-08-11 NOTE — ANESTHESIA PREPROCEDURE EVALUATION
Anesthesia Evaluation     . Pt has had prior anesthetic. 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 : . . . :. . Previous cardiac testing date:results:date: results:ECG reviewed date: results: date: results:          METS/Exercise Tolerance:  >4 METS   Hematologic:  - neg hematologic  ROS       Musculoskeletal:   (+) , , other musculoskeletal- hip dislocation      GI/Hepatic:  - neg GI/hepatic ROS       Renal/Genitourinary:  - ROS Renal section negative       Endo:  - neg endo ROS       Psychiatric:  - neg psychiatric ROS       Infectious Disease:  - neg infectious disease ROS       Malignancy:      - no malignancy   Other: Comment: Sticklers Syndrome    (+) H/O Chronic Pain,H/O chronic opiod use ,   - neg other ROS                 Physical Exam  Normal systems: cardiovascular, pulmonary and dental    Airway   Mallampati: II  TM distance: <3 FB  Neck ROM: full    Dental     Cardiovascular   Rhythm and rate: regular and normal      Pulmonary    breath sounds clear to auscultation                    Anesthesia Plan      History & Physical Review  History and physical reviewed and following examination; no interval change.    ASA Status:  1 .    NPO Status:  > 8 hours    Plan for General and LMA with Propofol and Intravenous induction. Maintenance will be Balanced.    PONV prophylaxis:  Ondansetron (or other 5HT-3) and Dexamethasone or Solumedrol       Postoperative Care  Postoperative pain management:  Multi-modal analgesia.      Consents  Anesthetic plan, risks, benefits and alternatives discussed with:  Patient.  Use of blood products discussed: No .   .

## 2018-08-11 NOTE — PROGRESS NOTES
Arrived to unit 8A around 1830 from medical transport. Pt transported from stretcher into bed. Abduction pillow in place and LLE elevated on pillow. Skin intact ex for bruising. VSS. Pt oriented to call light system and room environment. Pt is c/o pain and anxiety but no orders available, paged ortho on call that no team is assigned to patient and that no orders have been placed. When checked on patient at 1900, pt is sleeping soundly in bed. Call light within reach, continue to monitor.

## 2018-08-11 NOTE — PLAN OF CARE
Problem: Patient Care Overview  Goal: Individualization & Mutuality  Pt A&O x's 4. VSS. Afebrile. 02 sats in the 90s on RA. Lungs clear. Denies SOB, CP and nausea. Pt NPO since midnight.  Bowel sound active in all quadrants. No BM but passing gas. Has lopez, draining adequate amount. Pain fairly controlled with oral and IV pain med. CMS intact, baseline numbness. PIV SL.  Pt slept between care and is able to make needs known, call light with in reach. Will   continue to monitor.     Pt left the unit for surgery at 0710.

## 2018-08-11 NOTE — OP NOTE
Op Report    Pre-operative diagnosis:                   Left Hip Replacement Dislocation  Post-operative diagnosis                  Same  Procedure:                Procedure(s): Closed Left Hip Reduction - Wound Class: I-Clean  Surgeon:                   Surgeon(s) and Role:     * Kosta Pastrana MD - Primary     * Dre Franz MD - Resident - Assisting     * Mike Ying MD - Resident - Assisting  Anesthesia:               General                       Estimated blood loss:            None  Drains:           None  Specimens:              none   Findings:                                         posterior hip dislocation, reduced without difficulty   Complications:                      None.    Procedure Note:  Indications for procedure.  States the cough is a 48-year-old female who sustained a left posterior hip dislocation which failed closed reduction attempts at an outside hospital.  Given the dislocated hip and challenge at reduction she was transferred to the [facility] for definitive management.  Description of procedure:  Patient was met in the preoperative holding area where the consent form including the risks and benefits was reviewed signed and correct site was marked. She was transferred to the operating room theater where general anesthesia was induced.  The patient was transferred to the operating room table and C arm imaging was used to confirm a posterior hip dislocation.  Using flexion, adduction, and internal rotation along with modest axial traction the hip was reduced.  Postreduction the patient was stable in 110  of flexion and at 90 had 10  of internal rotation and 25  of external rotation. These were end-points and not points of dislocation. The hip was stable through this range of motion. The patient was extubated without difficulty and transferred to the PACU in stable condition.    Postoperative plan weightbearing as tolerated, abduction pillow until awake.  Physical therapy  for mobility assistive devices as needed.  No indication for any DVT prophylaxis.  Patient should follow-up with orthopedic surgeon near home in 1-2 weeks.     Dr. Pastrana was present for all critical portions of the case    Dre Franz MD   PGY-4 Orthopaedic Surgery

## 2018-08-12 ENCOUNTER — APPOINTMENT (OUTPATIENT)
Dept: PHYSICAL THERAPY | Facility: CLINIC | Age: 48
DRG: 560 | End: 2018-08-12
Attending: ORTHOPAEDIC SURGERY
Payer: COMMERCIAL

## 2018-08-12 VITALS
DIASTOLIC BLOOD PRESSURE: 54 MMHG | SYSTOLIC BLOOD PRESSURE: 104 MMHG | TEMPERATURE: 98 F | WEIGHT: 150 LBS | HEART RATE: 72 BPM | BODY MASS INDEX: 28.32 KG/M2 | HEIGHT: 61 IN | OXYGEN SATURATION: 94 % | RESPIRATION RATE: 16 BRPM

## 2018-08-12 LAB — HGB BLD-MCNC: 8.7 G/DL (ref 11.7–15.7)

## 2018-08-12 PROCEDURE — A9270 NON-COVERED ITEM OR SERVICE: HCPCS | Mod: GY | Performed by: HOSPITALIST

## 2018-08-12 PROCEDURE — A9270 NON-COVERED ITEM OR SERVICE: HCPCS | Mod: GY | Performed by: STUDENT IN AN ORGANIZED HEALTH CARE EDUCATION/TRAINING PROGRAM

## 2018-08-12 PROCEDURE — 25000128 H RX IP 250 OP 636: Performed by: STUDENT IN AN ORGANIZED HEALTH CARE EDUCATION/TRAINING PROGRAM

## 2018-08-12 PROCEDURE — 97116 GAIT TRAINING THERAPY: CPT | Mod: GP | Performed by: PHYSICAL THERAPIST

## 2018-08-12 PROCEDURE — 25000132 ZZH RX MED GY IP 250 OP 250 PS 637: Performed by: HOSPITALIST

## 2018-08-12 PROCEDURE — 85018 HEMOGLOBIN: CPT | Performed by: STUDENT IN AN ORGANIZED HEALTH CARE EDUCATION/TRAINING PROGRAM

## 2018-08-12 PROCEDURE — A9270 NON-COVERED ITEM OR SERVICE: HCPCS | Mod: GY | Performed by: INTERNAL MEDICINE

## 2018-08-12 PROCEDURE — 40000894 ZZH STATISTIC OT IP EVAL DEFER

## 2018-08-12 PROCEDURE — 97161 PT EVAL LOW COMPLEX 20 MIN: CPT | Mod: GP | Performed by: PHYSICAL THERAPIST

## 2018-08-12 PROCEDURE — 99231 SBSQ HOSP IP/OBS SF/LOW 25: CPT | Performed by: HOSPITALIST

## 2018-08-12 PROCEDURE — 36415 COLL VENOUS BLD VENIPUNCTURE: CPT | Performed by: STUDENT IN AN ORGANIZED HEALTH CARE EDUCATION/TRAINING PROGRAM

## 2018-08-12 PROCEDURE — 97110 THERAPEUTIC EXERCISES: CPT | Mod: GP | Performed by: PHYSICAL THERAPIST

## 2018-08-12 PROCEDURE — 97530 THERAPEUTIC ACTIVITIES: CPT | Mod: GP | Performed by: PHYSICAL THERAPIST

## 2018-08-12 PROCEDURE — 25000131 ZZH RX MED GY IP 250 OP 636 PS 637: Mod: GY | Performed by: INTERNAL MEDICINE

## 2018-08-12 PROCEDURE — 40000193 ZZH STATISTIC PT WARD VISIT: Performed by: PHYSICAL THERAPIST

## 2018-08-12 PROCEDURE — 25000132 ZZH RX MED GY IP 250 OP 250 PS 637: Mod: GY | Performed by: STUDENT IN AN ORGANIZED HEALTH CARE EDUCATION/TRAINING PROGRAM

## 2018-08-12 RX ORDER — KETOROLAC TROMETHAMINE 15 MG/ML
15 INJECTION, SOLUTION INTRAMUSCULAR; INTRAVENOUS EVERY 6 HOURS
Status: COMPLETED | OUTPATIENT
Start: 2018-08-12 | End: 2018-08-12

## 2018-08-12 RX ORDER — ACETAMINOPHEN 325 MG/1
650 TABLET ORAL EVERY 4 HOURS PRN
Qty: 100 TABLET | Refills: 0 | Status: SHIPPED | OUTPATIENT
Start: 2018-08-13

## 2018-08-12 RX ORDER — GABAPENTIN 600 MG/1
600 TABLET ORAL 2 TIMES DAILY
Status: DISCONTINUED | OUTPATIENT
Start: 2018-08-12 | End: 2018-08-12 | Stop reason: HOSPADM

## 2018-08-12 RX ORDER — GABAPENTIN 300 MG/1
900 CAPSULE ORAL AT BEDTIME
Status: DISCONTINUED | OUTPATIENT
Start: 2018-08-12 | End: 2018-08-12 | Stop reason: HOSPADM

## 2018-08-12 RX ORDER — OXYCODONE HYDROCHLORIDE 5 MG/1
5-10 TABLET ORAL EVERY 4 HOURS PRN
Qty: 40 TABLET | Refills: 0 | Status: ON HOLD | OUTPATIENT
Start: 2018-08-12 | End: 2018-09-06

## 2018-08-12 RX ORDER — GABAPENTIN 600 MG/1
600 TABLET ORAL 2 TIMES DAILY
Status: DISCONTINUED | OUTPATIENT
Start: 2018-08-12 | End: 2018-08-12

## 2018-08-12 RX ADMIN — DULOXETINE HYDROCHLORIDE 60 MG: 60 CAPSULE, DELAYED RELEASE ORAL at 09:22

## 2018-08-12 RX ADMIN — PANTOPRAZOLE SODIUM 40 MG: 40 TABLET, DELAYED RELEASE ORAL at 09:22

## 2018-08-12 RX ADMIN — METOCLOPRAMIDE 10 MG: 10 TABLET ORAL at 09:22

## 2018-08-12 RX ADMIN — GABAPENTIN 600 MG: 600 TABLET, FILM COATED ORAL at 10:26

## 2018-08-12 RX ADMIN — OXYCODONE HYDROCHLORIDE 10 MG: 5 TABLET ORAL at 12:31

## 2018-08-12 RX ADMIN — OXYCODONE HYDROCHLORIDE 10 MG: 5 TABLET ORAL at 06:32

## 2018-08-12 RX ADMIN — KETOROLAC TROMETHAMINE 15 MG: 15 INJECTION, SOLUTION INTRAMUSCULAR; INTRAVENOUS at 10:30

## 2018-08-12 RX ADMIN — METOCLOPRAMIDE 10 MG: 10 TABLET ORAL at 12:31

## 2018-08-12 RX ADMIN — ACETAMINOPHEN 975 MG: 325 TABLET, FILM COATED ORAL at 14:39

## 2018-08-12 RX ADMIN — METOCLOPRAMIDE 10 MG: 10 TABLET ORAL at 16:46

## 2018-08-12 RX ADMIN — OXYCODONE HYDROCHLORIDE 10 MG: 5 TABLET ORAL at 15:19

## 2018-08-12 RX ADMIN — MODAFINIL 250 MG: 100 TABLET ORAL at 09:22

## 2018-08-12 RX ADMIN — ACETAMINOPHEN 975 MG: 325 TABLET, FILM COATED ORAL at 06:34

## 2018-08-12 RX ADMIN — OXYCODONE HYDROCHLORIDE 10 MG: 5 TABLET ORAL at 09:29

## 2018-08-12 RX ADMIN — GABAPENTIN 600 MG: 600 TABLET, FILM COATED ORAL at 14:39

## 2018-08-12 RX ADMIN — ROPINIROLE HYDROCHLORIDE 2 MG: 2 TABLET, FILM COATED ORAL at 09:22

## 2018-08-12 RX ADMIN — KETOROLAC TROMETHAMINE 15 MG: 15 INJECTION, SOLUTION INTRAMUSCULAR; INTRAVENOUS at 16:46

## 2018-08-12 RX ADMIN — OXYCODONE HYDROCHLORIDE 10 MG: 5 TABLET ORAL at 03:29

## 2018-08-12 ASSESSMENT — ACTIVITIES OF DAILY LIVING (ADL)
ADLS_ACUITY_SCORE: 16
ADLS_ACUITY_SCORE: 16
ADLS_ACUITY_SCORE: 12
ADLS_ACUITY_SCORE: 10
ADLS_ACUITY_SCORE: 16

## 2018-08-12 NOTE — DISCHARGE SUMMARY
ORTHOPEDIC SURGERY DISCHARGE SUMMARY    Paulina Fonseca [id number] 8/10/2018  6:34 PM  48 year old female  8/12/2018    Date of Admission: 8/10/2018  Date of Discharge: 8/12/2018  Disposition: Home  Primary care physician: No primary care provider on file.  Orthopaedic physician provider(s): Dr. Victoriano MD, Orthopedic Surgery    ADMISSION DIAGNOSIS:  Dislocated left total hip     HPI:  (from admission H&P)  48 year old female with a medical history of Sticklers Syndrome who presents to [facility] as a transfer from [place] for a left prosthetic hip dislocation. She was helping her mother clean her house. She reportedly took several oxycodone tablets to help her get through cleaning. She subsequently fell onto her left hip and was unable to weight bear. She presented to a hospital in [place]. While there, orthopaedics reportedly tried to reduce her hip several times under anesthesia, but were unable to reduce it. She was subsequently transferred here for care of her left hip.  She has had bilateral hip, knee and ankle replacements.  At rest her pain is controlled.    DISCHARGE DIAGNOSIS:  Dislocated left total hip     SURGERY:  Closed reduction L KAILEY on 8/12    HOSPITAL COURSE:  Surgery was uncomplicated. The patient has done well post-operatively. They have received routine nursing cares and is medically stable. Vital signs are stable. They are tolerating a regular diet without GI distress/nausea or vomiting; and voiding spontaneously. They have met PT/OT goals for safe mobility. Pain is controlled on oral medications which will be available at home. They will be given stool softeners to use while taking pain medications to help prevent constipation. He has a safe discharge plan to home.     DVT prophylaxis:  Ambulation   Antibiotics:  None   Activity:   WBAT LLE, use assistive device   Follow up:   Continue to follow with your orthopedic surgeon close to home    Other discharge orders and instructions as  below.    ORTHOPEDIC EXAM:  Vitals:    B/P: 122/70, T: 98.9, P: 72, R: 16  Physical Exam:  Please see the progress note from the day of discharge.   Pertinent exam findings followed during admission: none     LABS:    Recent Labs  Lab 08/12/18  0547 08/11/18  0532 08/10/18  2352   WBC  --   --  8.5   HGB 8.7* 9.6* 9.1*   PLT  --   --  201       Recent Labs  Lab 08/11/18  0532   *   POTASSIUM 4.1   CHLORIDE 114*   CO2 27   BUN 17   CR 0.54   GLC 83       Recent Labs  Lab 08/11/18  0532   INR 0.97       ALLERGIES:     Allergies   Allergen Reactions     Codeine GI Disturbance       PENDING TESTS RESULTS:  none    SUB-SPECIALTY CONSULTANT RECOMMENDATIONS:  N/a      PLANNED DISCHARGE ORDERS, RECOMMENDATIONS, AND FOLLOWUP:    Discharge Procedure Orders  Reason for your hospital stay   Order Comments: Hip dislocation     Follow Up and recommended labs and tests   Order Comments: Contact your primary surgeon to plan your next follow up appointment.     Activity   Order Comments: Your activity upon discharge: WBAT with assistive device   Order Specific Question Answer Comments   Is discharge order? Yes      Full Code     Diet   Order Comments: Follow this diet upon discharge: Orders Placed This Encounter     Advance Diet as Tolerated: Regular Diet Adult   Order Specific Question Answer Comments   Is discharge order? Yes           Review of your medicines      START taking       Dose / Directions    acetaminophen 325 MG tablet   Commonly known as:  TYLENOL        Dose:  650 mg   Start taking on:  8/13/2018   Take 2 tablets (650 mg) by mouth every 4 hours as needed for other (multimodal surgical pain management along with NSAIDS and opioid medication as indicated based on pain control and physical function.)   Quantity:  100 tablet   Refills:  0       oxyCODONE IR 5 MG tablet   Commonly known as:  ROXICODONE        Dose:  5-10 mg   Take 1-2 tablets (5-10 mg) by mouth every 4 hours as needed for other (pain control or  improvement in physical function. Hold dose for analgesic side effects.)   Quantity:  40 tablet   Refills:  0         CONTINUE these medicines which have NOT CHANGED       Dose / Directions    DULOXETINE HCL PO        Dose:  60 mg   Take 60 mg by mouth daily   Refills:  0       gabapentin 300 MG 24 hr tablet   Commonly known as:  GRALISE        Dose:  600 mg   Take 600 mg by mouth 2 times daily AM and Lunch time   Refills:  0       GABAPENTIN PO        Dose:  600 mg   Take 600 mg by mouth 2 times daily AM and Lunch time   Refills:  0       MODAFINIL PO        Dose:  250 mg   Take 250 mg by mouth every morning   Refills:  0       PROTONIX PO        Dose:  40 mg   Take 40 mg by mouth every morning (before breakfast)   Refills:  0       REGLAN PO        Dose:  10 mg   Take 10 mg by mouth 3 times daily (before meals)   Refills:  0       ROPINIROLE HCL PO        Dose:  2 mg   Take 2 mg by mouth 3 times daily   Refills:  0       TIZANIDINE HCL PO        Dose:  4 mg   Take 4 mg by mouth every 8 hours as needed for muscle spasms   Refills:  0            Where to get your medicines      Some of these will need a paper prescription and others can be bought over the counter. Ask your nurse if you have questions.     Bring a paper prescription for each of these medications      acetaminophen 325 MG tablet     oxyCODONE IR 5 MG tablet           Kishore Masterson MD  Orthopedic Surgery, PGY-4  Pager: [phone]

## 2018-08-12 NOTE — PROGRESS NOTES
08/12/18 0800   Quick Adds   Type of Visit Initial PT Evaluation       Present no   Living Environment   Lives With sibling(s)   Living Arrangements house   Home Accessibility no concerns   Living Environment Comment Pt lives with sister, they have ramp to enter home and no stairs   Self-Care   Usual Activity Tolerance good   Current Activity Tolerance moderate   Regular Exercise no   Equipment Currently Used at Home none   Activity/Exercise/Self-Care Comment Pt reports disability, she does go out to smoke, does not do a lot of physical activity but housekeeping   Functional Level Prior   Ambulation 0-->independent   Transferring 0-->independent   Toileting 0-->independent   Bathing 0-->independent   Dressing 0-->independent   Eating 0-->independent   Communication 0-->understands/communicates without difficulty   Swallowing 0-->swallows foods/liquids without difficulty   Cognition 0 - no cognition issues reported   Fall history within last six months yes   Number of times patient has fallen within last six months 1   Which of the above functional risks had a recent onset or change? ambulation;transferring;dressing   Prior Functional Level Comment Pt was independent   General Information   Onset of Illness/Injury or Date of Surgery - Date 08/09/18   Referring Physician Dr. Kosta Pastrana   Patient/Family Goals Statement go home   Pertinent History of Current Problem (include personal factors and/or comorbidities that impact the POC) Pt suffered a fall while cleaning at home with resultant L hip dislocation. She presented to ED near home but unable to successfully reduce, pt transferred to [facility] and underwent closed reduction.   Precautions/Limitations fall precautions   General Info Comments Defer hip precautions to her primary surgeon at [facility]   Cognitive Status Examination   Orientation orientation to person, place and time   Level of Consciousness alert   Follows Commands and Answers Questions  "100% of the time   Personal Safety and Judgment intact   Memory intact   Pain Assessment   Patient Currently in Pain No   Integumentary/Edema   Integumentary/Edema Comments L thigh swelling, B ankle swelling   Posture    Posture Not impaired   Range of Motion (ROM)   ROM Comment Pt protecting hip ROM, hx of B TKA, TAA and KAILEY   Strength   Strength Comments Pt able to complete SLR on B LE but told not to do repeated   Bed Mobility   Bed Mobility Comments independent   Transfer Skills   Transfer Comments independent   Gait   Gait Comments SBA without device for 50'   Balance   Balance Comments fair, dynamic limited by pain   Sensory Examination   Sensory Perception no deficits were identified   Coordination   Coordination no deficits were identified   Muscle Tone   Muscle Tone no deficits were identified   General Therapy Interventions   Planned Therapy Interventions bed mobility training;gait training;ROM;strengthening;transfer training;home program guidelines   Clinical Impression   Criteria for Skilled Therapeutic Intervention yes, treatment indicated   PT Diagnosis impaired mobility following dislocation   Influenced by the following impairments decreased strength, decreased functional ROM, sore   Functional limitations due to impairments transfers, gait   Clinical Presentation Stable/Uncomplicated   Clinical Presentation Rationale pt doing well after reduction, will dc today   Clinical Decision Making (Complexity) Low complexity   Therapy Frequency` daily   Predicted Duration of Therapy Intervention (days/wks) 1 day    Anticipated Discharge Disposition Home   Risk & Benefits of therapy have been explained Yes   Patient, Family & other staff in agreement with plan of care Yes   Clinical Impression Comments PT offered walker for home for longer walks but pt declined   [facility] AM-PAC TM \"6 Clicks\"   2016, Trustees of [facility], under license to [employer].  All rights reserved.   6 Clicks Short " "Forms Basic Mobility Inpatient Short Form   [facility] AM-PAC  \"6 Clicks\" V.2 Basic Mobility Inpatient Short Form   1. Turning from your back to your side while in a flat bed without using bedrails? 4 - None   2. Moving from lying on your back to sitting on the side of a flat bed without using bedrails? 4 - None   3. Moving to and from a bed to a chair (including a wheelchair)? 4 - None   4. Standing up from a chair using your arms (e.g., wheelchair, or bedside chair)? 4 - None   5. To walk in hospital room? 4 - None   6. Climbing 3-5 steps with a railing? 3 - A Little   Basic Mobility Raw Score (Score out of 24.Lower scores equate to lower levels of function) 23   Total Evaluation Time   Total Evaluation Time (Minutes) 10     "

## 2018-08-12 NOTE — PHARMACY
Prescriber Notification Note    The pharmacist has communicated with this patient's provider regarding a concern or therapy recommendation.    Notified Person: Dr. Toro  Date/Time of Notification: 8:59 PM   Interaction: phone  Concern/Recommendation: Patient's home medication list contains gabapentin twice as both ER and IR with the same dose instructions. Patient states that she uses 900 mg TID per the physician. Through discussion with provider, orders placed for gabapentin  mg TID.      Maximino Quezada  08/11/18

## 2018-08-12 NOTE — PROGRESS NOTES
[facility] [place]    Hospitalist Progress Note    Date of Service (when I saw the patient): 08/12/2018    Assessment & Plan     Prosthetic Hip Dislocation SP closed reduction in OR under general anesthesia. Doing well. Plan per ortho     Restless leg syndrome  -  Resume home medications once verified (Ropinirole and Reglan for nausea)  -  Obtain iron studies  Chronic pain  - continue neurontin, cymbalta, tizanidine  - PT/OT consult obtained by primary service     Continue Modafinil as before. Not sure why she takes that.     DVT Prophylaxis: Defer to primary service  Code Status: Prior    Disposition: DC home with PT today per ortho    Valeria Smalls MD    Interval History     She is doing well. Pain was not under control, so she is getting some toradol. Reports no dizziness. He leg slipped causing the fall it seems.     -Data reviewed today: I reviewed all new labs and imaging results over the last 24 hours. I personally reviewed no images or EKG's today.    Physical Exam   Temp: 98.4  F (36.9  C) Temp src: Oral BP: 106/59 Pulse: 72 Heart Rate: 82 Resp: 16 SpO2: 94 % O2 Device: None (Room air) Oxygen Delivery: 2 LPM  Vitals:    08/11/18 0710   Weight: 68 kg (150 lb)     Vital Signs with Ranges  Temp:  [98.4  F (36.9  C)-99  F (37.2  C)] 98.4  F (36.9  C)  Pulse:  [72-78] 72  Heart Rate:  [78-82] 82  Resp:  [16-18] 16  BP: ()/(53-70) 106/59  SpO2:  [93 %-99 %] 94 %  I/O last 3 completed shifts:  In: 287 [I.V.:287]  Out: 1260 [Urine:1260]    Constitutional: Awake, alert, cooperative, no apparent distress  Respiratory: Clear to auscultation bilaterally, no crackles or wheezing  Cardiovascular: Regular rate and rhythm, normal S1 and S2, and no murmur noted  GI: Normal bowel sounds, soft, non-distended, non-tender  Skin/Integumen: No rashes, no cyanosis, no edema      Medications     sodium chloride         acetaminophen  975 mg Oral Q8H     DULoxetine (CYMBALTA) EC capsule 60 mg   60 mg Oral Daily     gabapentin  900 mg Oral At Bedtime     gabapentin (NEURONTIN) tablet 600 mg  600 mg Oral BID     ketorolac  15 mg Intravenous Q6H     metoclopramide (REGLAN) tablet 10 mg  10 mg Oral TID AC     modafinil (PROVIGIL) half-tab 250 mg  250 mg Oral QAM     pantoprazole (PROTONIX) EC tablet 40 mg  40 mg Oral QAM AC     rOPINIRole (REQUIP) tablet 2 mg  2 mg Oral TID     sodium chloride (PF)  3 mL Intracatheter Q8H       Data     Recent Labs  Lab 08/12/18  0547 08/11/18  0532 08/10/18  5028   WBC  --   --  8.5   HGB 8.7* 9.6* 9.1*   MCV  --   --  81   PLT  --   --  201   INR  --  0.97  --    NA  --  146*  --    POTASSIUM  --  4.1  --    CHLORIDE  --  114*  --    CO2  --  27  --    BUN  --  17  --    CR  --  0.54  --    ANIONGAP  --  5  --    MARK  --  7.9*  --    GLC  --  83  --        No results found for this or any previous visit (from the past 24 hour(s)).

## 2018-08-12 NOTE — PLAN OF CARE
Problem: Patient Care Overview  Goal: Plan of Care/Patient Progress Review  OT: Orders received and appreciated.  Per chart review and discussion with physical therapy, pt with no skilled needs for OT at this time.  Pt has had multiple LE surgeries, familiar with post-op course.  Working with PT for mobility, safe dc plan in place.  OT to defer and complete orders.

## 2018-08-12 NOTE — PLAN OF CARE
Problem: Pain, Acute (Adult)  Goal: Identify Related Risk Factors and Signs and Symptoms  Related risk factors and signs and symptoms are identified upon initiation of Human Response Clinical Practice Guideline (CPG).   Outcome: Adequate for Discharge Date Met: 08/12/18    VS: VSS   O2: >90% on RA   Output: Pt voiding in adequate amounts in BR   Last BM: LBM 8/12 per pt report, passing flatus and BS active.   Activity: Pt up independently, ambulated in room and hallways, not using assistive devices.    Skin: Skin intact ex for scars and bruises (there PTA)   Pain: Pain is being managed with oxycodone 5-10 mg Q3H (changed to Q4H upon discharge). Receives 10 mg at a time. Given 1x dose of toradol prior to discharge. Pt states pain is tolerable and at a constant 6/10.    CMS: Baseline neuropathy in BLE, DP +2 bilaterally, able to wiggle toes.    Dressing: N/A   Diet: Tolerating regular diet without N/V, adequate intake of PO fluids.    LDA: PIV removed prior to discharge    Equipment: Abduction pillow, personal belongings at bedside.   Plan: Discharge home with assist from family, will be staying with sister. Sister will provide transport home.     Pt given all discharge instructions and discharge medications. Pt states no further questions, reports understanding that she will need to call to schedule follow-up appointment. PIV removed prior to discharge. Pt has all personal belongings, requesting to ambulate independently with sister to car. Sister will provide transport home. Pt left unit 8A at 1715.

## 2018-08-12 NOTE — PROGRESS NOTES
"Orthopedics Daily Progress Note    S: no acute events overnight, pain controlled with oral meds, tolerating diet, voiding, no numbness/tinglings, chest pain/shortness of breath, or fever/chills.     O: /59 (BP Location: Right arm)  Pulse 72  Temp 98.4  F (36.9  C) (Oral)  Resp 16  Ht 1.539 m (5' 0.6\")  Wt 68 kg (150 lb)  SpO2 94%  BMI 28.72 kg/m2    Intake/Output Summary (Last 24 hours) at 08/12/18 0753  Last data filed at 08/12/18 0013   Gross per 24 hour   Intake              287 ml   Output             1060 ml   Net             -773 ml       No acute distress  Non-labored respirations  LLE: 5/5 df/pf/ehl/fhl, sensation intact in deep peroneal, superficial peroneal, tibial, sural, saphenous nerve distributions, 2+ DP pulse.    Labs:    Recent Labs  Lab 08/11/18  0532   *   POTASSIUM 4.1   CHLORIDE 114*   CO2 27   BUN 17   CR 0.54   GLC 83       Recent Labs  Lab 08/12/18  0547 08/11/18  0532 08/10/18  2352   WBC  --   --  8.5   HGB 8.7* 9.6* 9.1*   PLT  --   --  201       Recent Labs  Lab 08/11/18  0532   INR 0.97     A/P:   Paulina Fonseca is a 48 year old female s/p closed reduction of a left KAILEY dislocation on 8/11    -Ortho primary   -WB: WBAT LLE  -Activity: with assistive device; defer hip precautions to primary orthopedic surgeon   -DVT ppx: ambulation   -Abx: none  -PT/OT  -Pain: orals  -Diet: regular  -Labs: none  -Follow up: with surgeon locally   -Dispo: home after PT today    Kishore Masterson MD   Orthopedic Surgery; PGY-4  Pager: [phone]    For any questions regarding this patient please page me prior to paging the ortho resident on call.   "

## 2018-08-12 NOTE — PLAN OF CARE
Problem: Patient Care Overview  Goal: Plan of Care/Patient Progress Review  Pt seen by PT for evaluation, reviewed hip posterior hip precautions but relayed to patient that she should talk to her surgeon at home about movements to avoid. Pt ambulates independently with short steps and wide CHEPE. She declines use of walker. Pt familiar with adaptive equipment and sister has some she can borrow. Pt with walk in shower so no OT needs identified. Bed mobility and transfers are independent after education.  Pt issued HEP for hip ROM to decrease pain and swelling and strengthening to progress gait.    Physical Therapy Discharge Summary    Reason for therapy discharge:    Discharged to home.  All goals and outcomes met, no further needs identified.    Progress towards therapy goal(s). See goals on Care Plan in [facility] electronic health record for goal details.  Goals met    Therapy recommendation(s):    Continue home exercise program.

## 2018-08-12 NOTE — PLAN OF CARE
Problem: Pain, Acute (Adult)  Goal: Identify Related Risk Factors and Signs and Symptoms  Related risk factors and signs and symptoms are identified upon initiation of Human Response Clinical Practice Guideline (CPG).           VS:       Pt A/O X 4. Afebrile. VSS. Lungs-clear bilaterally with both anterior and posterior. IS encouraged. Denies nausea, shortness of breath, and chest pain.     Output:       Bowels- active in all four quadrants. Last BM 8/11 per pt report, pt is passing gas. Voids spontaneously without difficulty in the bathroom.      Activity:       Pt up in room, to bathroom, and in hallways independently ad sonal.     Skin:   Scars, bruises, intact.     Pain:       Has pain in the left hip and given prn Oxycodone, schedule Toradol, scheduled Tylenol and is tolerating well.      CMS:       CMS and Neuro's are intact. Denies numbness and tingling in all extremities.      Dressing:       No dressing in place.       Diet:       Pt is on a regular diet and appetite was good this shift.       LDA:       PIV is patent in the left arm and SL.      Equipment:       Pt refused to wear PCDs this shift, ambulates frequently. Bilateral heels are elevated off the bed.     Plan:       Pt is able to make needs known and the call light is within the pt's reach. Continue to monitor.       Additional Info:       Pt to discharge home later this afternoon/early evening when sister arrives to pick her up. Discharge instructions discussed throughout this shift. Oncoming RN notified.

## 2018-08-12 NOTE — PLAN OF CARE
Problem: Patient Care Overview  Goal: Plan of Care/Patient Progress Review    VS: VSS and afebrile. Refused Capno at night. Oximeter in place.    O2: In room, denies SOB and chest pain.    Output: Arguelles catheter was removed around 10pm 8/11/18. Voids adequate amount. Last PVR: 0   Last BM: 8/11/18 and passing gas   Activity: WBaT, up to bathroom with SBA.    Skin: Intact   Pain: Managed with Oxycodone 5-10mg every 3H.    CMS: Baseline numbness and tingling    Dressing: None    Diet: Regular   LDA: PIV at L hand: patent and SL.    Equipment: Abduction pillow, FWW and personal belongings at bedside   Plan: TBD.    Additional Info:

## 2018-08-13 ENCOUNTER — CARE COORDINATION (OUTPATIENT)
Dept: CARE COORDINATION | Facility: CLINIC | Age: 48
End: 2018-08-13

## 2018-08-13 LAB — INTERPRETATION ECG - MUSE: NORMAL

## 2018-09-05 ENCOUNTER — HOSPITAL ENCOUNTER (INPATIENT)
Facility: CLINIC | Age: 48
LOS: 2 days | Discharge: HOME | DRG: 560 | End: 2018-09-07
Attending: EMERGENCY MEDICINE | Admitting: ORTHOPAEDIC SURGERY
Payer: COMMERCIAL

## 2018-09-05 ENCOUNTER — TRANSFERRED RECORDS (OUTPATIENT)
Dept: HEALTH INFORMATION MANAGEMENT | Facility: CLINIC | Age: 48
End: 2018-09-05

## 2018-09-05 ENCOUNTER — APPOINTMENT (OUTPATIENT)
Dept: GENERAL RADIOLOGY | Facility: CLINIC | Age: 48
DRG: 560 | End: 2018-09-05
Attending: EMERGENCY MEDICINE
Payer: COMMERCIAL

## 2018-09-05 DIAGNOSIS — S73.005A HIP DISLOCATION, LEFT, INITIAL ENCOUNTER (H): ICD-10-CM

## 2018-09-05 DIAGNOSIS — T84.021A: ICD-10-CM

## 2018-09-05 PROCEDURE — 99285 EMERGENCY DEPT VISIT HI MDM: CPT | Mod: 25

## 2018-09-05 PROCEDURE — 25000128 H RX IP 250 OP 636: Performed by: EMERGENCY MEDICINE

## 2018-09-05 PROCEDURE — 12000001 ZZH R&B MED SURG/OB UMMC

## 2018-09-05 PROCEDURE — 0SWBXJZ REVISION OF SYNTHETIC SUBSTITUTE IN LEFT HIP JOINT, EXTERNAL APPROACH: ICD-10-PCS | Performed by: EMERGENCY MEDICINE

## 2018-09-05 PROCEDURE — A9270 NON-COVERED ITEM OR SERVICE: HCPCS | Mod: GY | Performed by: EMERGENCY MEDICINE

## 2018-09-05 PROCEDURE — 96374 THER/PROPH/DIAG INJ IV PUSH: CPT

## 2018-09-05 PROCEDURE — 99285 EMERGENCY DEPT VISIT HI MDM: CPT | Mod: Z6 | Performed by: EMERGENCY MEDICINE

## 2018-09-05 PROCEDURE — 40000986 XR PELVIS AND HIP PORTABLE LEFT 1 VIEW

## 2018-09-05 PROCEDURE — 96376 TX/PRO/DX INJ SAME DRUG ADON: CPT

## 2018-09-05 PROCEDURE — 73502 X-RAY EXAM HIP UNI 2-3 VIEWS: CPT

## 2018-09-05 PROCEDURE — 25000132 ZZH RX MED GY IP 250 OP 250 PS 637: Mod: GY | Performed by: EMERGENCY MEDICINE

## 2018-09-05 RX ORDER — GABAPENTIN 300 MG/1
900 CAPSULE ORAL AT BEDTIME
Status: DISCONTINUED | OUTPATIENT
Start: 2018-09-06 | End: 2018-09-07 | Stop reason: HOSPADM

## 2018-09-05 RX ORDER — HYDROMORPHONE HYDROCHLORIDE 1 MG/ML
0.5 INJECTION, SOLUTION INTRAMUSCULAR; INTRAVENOUS; SUBCUTANEOUS
Status: COMPLETED | OUTPATIENT
Start: 2018-09-05 | End: 2018-09-05

## 2018-09-05 RX ORDER — NALOXONE HYDROCHLORIDE 0.4 MG/ML
.1-.4 INJECTION, SOLUTION INTRAMUSCULAR; INTRAVENOUS; SUBCUTANEOUS
Status: DISCONTINUED | OUTPATIENT
Start: 2018-09-05 | End: 2018-09-05

## 2018-09-05 RX ORDER — SODIUM CHLORIDE, SODIUM LACTATE, POTASSIUM CHLORIDE, CALCIUM CHLORIDE 600; 310; 30; 20 MG/100ML; MG/100ML; MG/100ML; MG/100ML
INJECTION, SOLUTION INTRAVENOUS CONTINUOUS
Status: DISCONTINUED | OUTPATIENT
Start: 2018-09-06 | End: 2018-09-07 | Stop reason: HOSPADM

## 2018-09-05 RX ORDER — FLUMAZENIL 0.1 MG/ML
0.2 INJECTION, SOLUTION INTRAVENOUS
Status: DISCONTINUED | OUTPATIENT
Start: 2018-09-05 | End: 2018-09-05

## 2018-09-05 RX ORDER — ONDANSETRON 4 MG/1
4 TABLET, ORALLY DISINTEGRATING ORAL EVERY 6 HOURS PRN
Status: DISCONTINUED | OUTPATIENT
Start: 2018-09-05 | End: 2018-09-07 | Stop reason: HOSPADM

## 2018-09-05 RX ORDER — NICOTINE 21 MG/24HR
1 PATCH, TRANSDERMAL 24 HOURS TRANSDERMAL ONCE
Status: COMPLETED | OUTPATIENT
Start: 2018-09-05 | End: 2018-09-05

## 2018-09-05 RX ORDER — ONDANSETRON 2 MG/ML
4 INJECTION INTRAMUSCULAR; INTRAVENOUS EVERY 6 HOURS PRN
Status: DISCONTINUED | OUTPATIENT
Start: 2018-09-05 | End: 2018-09-07 | Stop reason: HOSPADM

## 2018-09-05 RX ORDER — PANTOPRAZOLE SODIUM 40 MG/1
40 TABLET, DELAYED RELEASE ORAL
Status: DISCONTINUED | OUTPATIENT
Start: 2018-09-06 | End: 2018-09-07 | Stop reason: HOSPADM

## 2018-09-05 RX ORDER — METOCLOPRAMIDE 10 MG/1
10 TABLET ORAL
Status: DISCONTINUED | OUTPATIENT
Start: 2018-09-06 | End: 2018-09-07 | Stop reason: HOSPADM

## 2018-09-05 RX ORDER — GABAPENTIN 600 MG/1
600 TABLET ORAL 2 TIMES DAILY
Status: DISCONTINUED | OUTPATIENT
Start: 2018-09-06 | End: 2018-09-07 | Stop reason: HOSPADM

## 2018-09-05 RX ORDER — SODIUM CHLORIDE, SODIUM LACTATE, POTASSIUM CHLORIDE, CALCIUM CHLORIDE 600; 310; 30; 20 MG/100ML; MG/100ML; MG/100ML; MG/100ML
1000 INJECTION, SOLUTION INTRAVENOUS CONTINUOUS
Status: DISCONTINUED | OUTPATIENT
Start: 2018-09-05 | End: 2018-09-05

## 2018-09-05 RX ORDER — AMOXICILLIN 250 MG
2 CAPSULE ORAL 2 TIMES DAILY
Status: DISCONTINUED | OUTPATIENT
Start: 2018-09-06 | End: 2018-09-07 | Stop reason: HOSPADM

## 2018-09-05 RX ORDER — DULOXETIN HYDROCHLORIDE 60 MG/1
60 CAPSULE, DELAYED RELEASE ORAL DAILY
Status: DISCONTINUED | OUTPATIENT
Start: 2018-09-06 | End: 2018-09-07 | Stop reason: HOSPADM

## 2018-09-05 RX ORDER — ROPINIROLE 2 MG/1
2 TABLET, FILM COATED ORAL 3 TIMES DAILY
Status: DISCONTINUED | OUTPATIENT
Start: 2018-09-06 | End: 2018-09-07 | Stop reason: HOSPADM

## 2018-09-05 RX ORDER — NALOXONE HYDROCHLORIDE 0.4 MG/ML
.1-.4 INJECTION, SOLUTION INTRAMUSCULAR; INTRAVENOUS; SUBCUTANEOUS
Status: DISCONTINUED | OUTPATIENT
Start: 2018-09-05 | End: 2018-09-07 | Stop reason: HOSPADM

## 2018-09-05 RX ORDER — PROCHLORPERAZINE 25 MG
25 SUPPOSITORY, RECTAL RECTAL EVERY 12 HOURS PRN
Status: DISCONTINUED | OUTPATIENT
Start: 2018-09-05 | End: 2018-09-07 | Stop reason: HOSPADM

## 2018-09-05 RX ORDER — AMOXICILLIN 250 MG
1 CAPSULE ORAL 2 TIMES DAILY
Status: DISCONTINUED | OUTPATIENT
Start: 2018-09-06 | End: 2018-09-07 | Stop reason: HOSPADM

## 2018-09-05 RX ORDER — OXYCODONE HYDROCHLORIDE 5 MG/1
5-10 TABLET ORAL
Status: DISCONTINUED | OUTPATIENT
Start: 2018-09-05 | End: 2018-09-07 | Stop reason: HOSPADM

## 2018-09-05 RX ORDER — PROCHLORPERAZINE MALEATE 10 MG
10 TABLET ORAL EVERY 6 HOURS PRN
Status: DISCONTINUED | OUTPATIENT
Start: 2018-09-05 | End: 2018-09-07 | Stop reason: HOSPADM

## 2018-09-05 RX ORDER — HYDROMORPHONE HYDROCHLORIDE 1 MG/ML
.3-.5 INJECTION, SOLUTION INTRAMUSCULAR; INTRAVENOUS; SUBCUTANEOUS EVERY 6 HOURS PRN
Status: DISCONTINUED | OUTPATIENT
Start: 2018-09-05 | End: 2018-09-07 | Stop reason: HOSPADM

## 2018-09-05 RX ORDER — PROPOFOL 10 MG/ML
.5-1 INJECTION, EMULSION INTRAVENOUS ONCE
Status: COMPLETED | OUTPATIENT
Start: 2018-09-05 | End: 2018-09-05

## 2018-09-05 RX ORDER — ARMODAFINIL 250 MG/1
250 TABLET ORAL EVERY MORNING
Status: DISCONTINUED | OUTPATIENT
Start: 2018-09-06 | End: 2018-09-07 | Stop reason: HOSPADM

## 2018-09-05 RX ORDER — ACETAMINOPHEN 325 MG/1
650 TABLET ORAL EVERY 4 HOURS PRN
Status: DISCONTINUED | OUTPATIENT
Start: 2018-09-05 | End: 2018-09-07 | Stop reason: HOSPADM

## 2018-09-05 RX ADMIN — HYDROMORPHONE HYDROCHLORIDE 0.5 MG: 1 INJECTION, SOLUTION INTRAMUSCULAR; INTRAVENOUS; SUBCUTANEOUS at 20:54

## 2018-09-05 RX ADMIN — NICOTINE 1 PATCH: 21 PATCH, EXTENDED RELEASE TRANSDERMAL at 21:02

## 2018-09-05 RX ADMIN — PROPOFOL 40 MG: 10 INJECTION, EMULSION INTRAVENOUS at 22:43

## 2018-09-05 RX ADMIN — Medication 0.5 MG: at 23:45

## 2018-09-05 RX ADMIN — SODIUM CHLORIDE, POTASSIUM CHLORIDE, SODIUM LACTATE AND CALCIUM CHLORIDE 1000 ML: 600; 310; 30; 20 INJECTION, SOLUTION INTRAVENOUS at 22:08

## 2018-09-05 RX ADMIN — Medication 0.5 MG: at 22:09

## 2018-09-05 ASSESSMENT — ENCOUNTER SYMPTOMS
FEVER: 0
CONFUSION: 0
ARTHRALGIAS: 0
NECK STIFFNESS: 0
ABDOMINAL PAIN: 0
EYE REDNESS: 0
COLOR CHANGE: 0
SHORTNESS OF BREATH: 0
DIFFICULTY URINATING: 0
HEADACHES: 0

## 2018-09-05 NOTE — IP AVS SNAPSHOT
UR 8A    [address]    [facility] [address]    Phone:  [phone]                                       After Visit Summary   9/5/2018    Paulina Fonseca    MRN: [id number]           After Visit Summary Signature Page     I have received my discharge instructions, and my questions have been answered. I have discussed any challenges I see with this plan with the nurse or doctor.    ..........................................................................................................................................  Patient/Patient Representative Signature      ..........................................................................................................................................  Patient Representative Print Name and Relationship to Patient    ..................................................               ................................................  Date                                            Time    ..........................................................................................................................................  Reviewed by Signature/Title    ...................................................              ..............................................  Date                                                            Time          22EPIC Rev 08/18

## 2018-09-05 NOTE — IP AVS SNAPSHOT
MRN:[id number]                      After Visit Summary   9/5/2018    Paulina Fonseca    MRN: [id number]           Thank you!     Thank you for choosing [facility] for your care. Our goal is always to provide you with excellent care. Hearing back from our patients is one way we can continue to improve our services. Please take a few minutes to complete the written survey that you may receive in the mail after you visit with us. Thank you!        Patient Information     Date Of Birth          1970        Designated Caregiver       Most Recent Value    Caregiver    Will someone help with your care after discharge? yes    Name of designated caregiver Sharifa    Phone number of caregiver [phone]    Caregiver address [address] [facility] [address]. [phone]      About your hospital stay     You were admitted on:  September 5, 2018 You last received care in the:  [facility] 8[facility]    You were discharged on:  September 7, 2018        Reason for your hospital stay       You were hospitalized for pain control and therapy after hip dislocation.                  Who to Call     For medical emergencies, please call 911.  For non-urgent questions about your medical care, please call your primary care provider or clinic, [phone]          Attending Provider     Provider Specialty    Kishore Oliveira DO Emergency Medicine    Mary Kate Mcdonough MD Orthopedics       Primary Care Provider Office Phone # Fax #    Sanjuanitabrenda Terry [phone]      After Care Instructions     Activity       Weight bearing as tolerated. Posterior hip precautions.            Diet       Follow this diet upon discharge: Orders Placed This Encounter      Combination Diet Regular Diet Adult                  Follow-up Appointments     Adult [facility] Follow-up and recommended labs and tests       Follow-up with Orthopaedic Surgery - Dr. Pastrana - as needed.     [facility] [address]  ", [place] [address]). Call [phone] to schedule a follow-up appointment at this location with your provider.                  Pending Results     No orders found from 9/3/2018 to 2018.            Statement of Approval     Ordered          18 1455  I have reviewed and agree with all the recommendations and orders detailed in this document.  EFFECTIVE NOW     Approved and electronically signed by:  Zaria Rodas APRN CNP             Admission Information     Date & Time Provider Department Dept. Phone    2018 Mary Kate Mcdonough MD UR 8A [phone]      Your Vitals Were     Blood Pressure Pulse Temperature Respirations Weight Pulse Oximetry    118/78 83 98.9  F (37.2  C) (Oral) 16 66.2 kg (146 lb) 95%    BMI (Body Mass Index)                   27.95 kg/m2           MyChart Information     [employer] lets you send messages to your doctor, view your test results, renew your prescriptions, schedule appointments and more. To sign up, go to [email or web address] . Click on \"Log in\" on the left side of the screen, which will take you to the Welcome page. Then click on \"Sign up Now\" on the right side of the page.     You will be asked to enter the access code listed below, as well as some personal information. Please follow the directions to create your username and password.     Your access code is: [email or web address]  Expires: 11/10/2018 12:20 PM     Your access code will  in 90 days. If you need help or a new code, please call your [facility] clinic or [phone].        Care EveryWhere ID     This is your Care EveryWhere ID. This could be used by other organizations to access your [facility] medical records  [id number]        Equal Access to Services     CANDICE CARLTON AH: Carmen Rock, frances dow, aura manningalmada rajat, sandy cleaning. So [facility] [phone].    ATENCIÓN: Si habla español, tiene a cook disposición servicios gratuitos de " asistencia lingüística. Tonja al [phone].    We comply with applicable federal civil rights laws and [place] laws. We do not discriminate on the basis of race, color, national origin, age, disability, sex, sexual orientation, or gender identity.               Review of your medicines      CONTINUE these medicines which have NOT CHANGED        Dose / Directions    acetaminophen 325 MG tablet   Commonly known as:  TYLENOL   Used for:  Dislocation of left hip, initial encounter (H)        Dose:  650 mg   Take 2 tablets (650 mg) by mouth every 4 hours as needed for other (multimodal surgical pain management along with NSAIDS and opioid medication as indicated based on pain control and physical function.)   Quantity:  100 tablet   Refills:  0       armodafinil 250 MG Tabs tablet   Commonly known as:  NUVIGIL        Dose:  250 mg   Take 250 mg by mouth every morning   Refills:  0       DULOXETINE HCL PO        Dose:  60 mg   Take 60 mg by mouth daily   Refills:  0       gabapentin 300 MG capsule   Commonly known as:  NEURONTIN        Dose:  900 mg   Take 900 mg by mouth 3 times daily   Refills:  0       oxyCODONE IR 5 MG tablet   Commonly known as:  ROXICODONE   Used for:  Hip dislocation, left, initial encounter (H)        Dose:  5-10 mg   Take 1-2 tablets (5-10 mg) by mouth every 4 hours as needed for other (pain control or improvement in physical function. Hold dose for analgesic side effects.)   Quantity:  30 tablet   Refills:  0       PROTONIX PO        Dose:  40 mg   Take 40 mg by mouth every morning (before breakfast)   Refills:  0       REGLAN PO        Dose:  10 mg   Take 10 mg by mouth 3 times daily (before meals)   Refills:  0       ROPINIROLE HCL PO        Dose:  2 mg   Take 2 mg by mouth 3 times daily   Refills:  0       TIZANIDINE HCL PO        Dose:  4 mg   Take 4 mg by mouth every 8 hours as needed for muscle spasms   Refills:  0            Where to get your medicines      Some of these will need a  paper prescription and others can be bought over the counter. Ask your nurse if you have questions.     Bring a paper prescription for each of these medications     oxyCODONE IR 5 MG tablet                Protect others around you: Learn how to safely use, store and throw away your medicines at www.disposemymeds.org.        Information about OPIOIDS     PRESCRIPTION OPIOIDS: WHAT YOU NEED TO KNOW   We gave you an opioid (narcotic) pain medicine. It is important to manage your pain, but opioids are not always the best choice. You should first try all the other options your care team gave you. Take this medicine for as short a time (and as few doses) as possible.    Some activities can increase your pain, such as bandage changes or therapy sessions. It may help to take your pain medicine 30 to 60 minutes before these activities. Reduce your stress by getting enough sleep, working on hobbies you enjoy and practicing relaxation or meditation. Talk to your care team about ways to manage your pain beyond prescription opioids.    These medicines have risks:    DO NOT drive when on new or higher doses of pain medicine. These medicines can affect your alertness and reaction times, and you could be arrested for driving under the influence (DUI). If you need to use opioids long-term, talk to your care team about driving.    DO NOT operate heavy machinery    DO NOT do any other dangerous activities while taking these medicines.    DO NOT drink any alcohol while taking these medicines.     If the opioid prescribed includes acetaminophen, DO NOT take with any other medicines that contain acetaminophen. Read all labels carefully. Look for the word  acetaminophen  or  Tylenol.  Ask your pharmacist if you have questions or are unsure.    You can get addicted to pain medicines, especially if you have a history of addiction (chemical, alcohol or substance dependence). Talk to your care team about ways to reduce this risk.    All  opioids tend to cause constipation. Drink plenty of water and eat foods that have a lot of fiber, such as fruits, vegetables, prune juice, apple juice and high-fiber cereal. Take a laxative (Miralax, milk of magnesia, Colace, Senna) if you don t move your bowels at least every other day. Other side effects include upset stomach, sleepiness, dizziness, throwing up, tolerance (needing more of the medicine to have the same effect), physical dependence and slowed breathing.    Store your pills in a secure place, locked if possible. We will not replace any lost or stolen medicine. If you don t finish your medicine, please throw away (dispose) as directed by your pharmacist. The [place] Pollution Control Agency has more information about safe disposal: https://www.pca.[facility].mn.us/living-green/managing-unwanted-medications             Medication List: This is a list of all your medications and when to take them. Check marks below indicate your daily home schedule. Keep this list as a reference.      Medications           Morning Afternoon Evening Bedtime As Needed    acetaminophen 325 MG tablet   Commonly known as:  TYLENOL   Take 2 tablets (650 mg) by mouth every 4 hours as needed for other (multimodal surgical pain management along with NSAIDS and opioid medication as indicated based on pain control and physical function.)   Last time this was given:  650 mg on 9/7/2018  8:28 PM                                armodafinil 250 MG Tabs tablet   Commonly known as:  NUVIGIL   Take 250 mg by mouth every morning   Last time this was given:  250 mg on 9/7/2018  8:33 AM                                DULOXETINE HCL PO   Take 60 mg by mouth daily   Last time this was given:  60 mg on 9/6/2018  8:43 AM                                gabapentin 300 MG capsule   Commonly known as:  NEURONTIN   Take 900 mg by mouth 3 times daily   Last time this was given:  900 mg on 9/6/2018 10:32 PM                                oxyCODONE IR 5 MG  tablet   Commonly known as:  ROXICODONE   Take 1-2 tablets (5-10 mg) by mouth every 4 hours as needed for other (pain control or improvement in physical function. Hold dose for analgesic side effects.)   Last time this was given:  10 mg on 9/7/2018  8:28 PM                                PROTONIX PO   Take 40 mg by mouth every morning (before breakfast)   Last time this was given:  40 mg on 9/7/2018  8:33 AM                                REGLAN PO   Take 10 mg by mouth 3 times daily (before meals)   Last time this was given:  10 mg on 9/6/2018  7:30 PM                                ROPINIROLE HCL PO   Take 2 mg by mouth 3 times daily   Last time this was given:  2 mg on 9/6/2018  7:30 PM                                TIZANIDINE HCL PO   Take 4 mg by mouth every 8 hours as needed for muscle spasms

## 2018-09-06 ENCOUNTER — APPOINTMENT (OUTPATIENT)
Dept: PHYSICAL THERAPY | Facility: CLINIC | Age: 48
DRG: 560 | End: 2018-09-06
Payer: COMMERCIAL

## 2018-09-06 PROCEDURE — 12000001 ZZH R&B MED SURG/OB UMMC

## 2018-09-06 PROCEDURE — 97116 GAIT TRAINING THERAPY: CPT | Mod: GP

## 2018-09-06 PROCEDURE — 40000193 ZZH STATISTIC PT WARD VISIT

## 2018-09-06 PROCEDURE — 25000131 ZZH RX MED GY IP 250 OP 636 PS 637: Performed by: STUDENT IN AN ORGANIZED HEALTH CARE EDUCATION/TRAINING PROGRAM

## 2018-09-06 PROCEDURE — 97110 THERAPEUTIC EXERCISES: CPT | Mod: GP

## 2018-09-06 PROCEDURE — 97161 PT EVAL LOW COMPLEX 20 MIN: CPT | Mod: GP

## 2018-09-06 PROCEDURE — 25000132 ZZH RX MED GY IP 250 OP 250 PS 637: Performed by: STUDENT IN AN ORGANIZED HEALTH CARE EDUCATION/TRAINING PROGRAM

## 2018-09-06 PROCEDURE — 25000128 H RX IP 250 OP 636: Performed by: STUDENT IN AN ORGANIZED HEALTH CARE EDUCATION/TRAINING PROGRAM

## 2018-09-06 PROCEDURE — 40000894 ZZH STATISTIC OT IP EVAL DEFER

## 2018-09-06 RX ORDER — GABAPENTIN 300 MG/1
900 CAPSULE ORAL 3 TIMES DAILY
COMMUNITY

## 2018-09-06 RX ORDER — OXYCODONE HYDROCHLORIDE 5 MG/1
5-10 TABLET ORAL EVERY 4 HOURS PRN
Qty: 30 TABLET | Refills: 0 | Status: SHIPPED | OUTPATIENT
Start: 2018-09-06

## 2018-09-06 RX ORDER — ARMODAFINIL 250 MG/1
250 TABLET ORAL EVERY MORNING
COMMUNITY

## 2018-09-06 RX ADMIN — OXYCODONE HYDROCHLORIDE 10 MG: 5 TABLET ORAL at 10:11

## 2018-09-06 RX ADMIN — GABAPENTIN 900 MG: 300 CAPSULE ORAL at 22:32

## 2018-09-06 RX ADMIN — METOCLOPRAMIDE 10 MG: 10 TABLET ORAL at 19:30

## 2018-09-06 RX ADMIN — OXYCODONE HYDROCHLORIDE 10 MG: 5 TABLET ORAL at 01:51

## 2018-09-06 RX ADMIN — ROPINIROLE HYDROCHLORIDE 2 MG: 2 TABLET, FILM COATED ORAL at 08:43

## 2018-09-06 RX ADMIN — OXYCODONE HYDROCHLORIDE 10 MG: 5 TABLET ORAL at 16:23

## 2018-09-06 RX ADMIN — ACETAMINOPHEN 650 MG: 325 TABLET, FILM COATED ORAL at 13:08

## 2018-09-06 RX ADMIN — Medication 0.5 MG: at 05:40

## 2018-09-06 RX ADMIN — GABAPENTIN 600 MG: 600 TABLET, FILM COATED ORAL at 08:43

## 2018-09-06 RX ADMIN — ACETAMINOPHEN 650 MG: 325 TABLET, FILM COATED ORAL at 01:51

## 2018-09-06 RX ADMIN — ACETAMINOPHEN 650 MG: 325 TABLET, FILM COATED ORAL at 22:32

## 2018-09-06 RX ADMIN — METOCLOPRAMIDE 10 MG: 10 TABLET ORAL at 08:43

## 2018-09-06 RX ADMIN — GABAPENTIN 600 MG: 600 TABLET, FILM COATED ORAL at 13:09

## 2018-09-06 RX ADMIN — OXYCODONE HYDROCHLORIDE 10 MG: 5 TABLET ORAL at 22:32

## 2018-09-06 RX ADMIN — DULOXETINE HYDROCHLORIDE 60 MG: 60 CAPSULE, DELAYED RELEASE ORAL at 08:43

## 2018-09-06 RX ADMIN — OXYCODONE HYDROCHLORIDE 10 MG: 5 TABLET ORAL at 07:06

## 2018-09-06 RX ADMIN — ARMODAFINIL 250 MG: 250 TABLET ORAL at 11:43

## 2018-09-06 RX ADMIN — OXYCODONE HYDROCHLORIDE 10 MG: 5 TABLET ORAL at 13:08

## 2018-09-06 RX ADMIN — SODIUM CHLORIDE, POTASSIUM CHLORIDE, SODIUM LACTATE AND CALCIUM CHLORIDE: 600; 310; 30; 20 INJECTION, SOLUTION INTRAVENOUS at 01:17

## 2018-09-06 RX ADMIN — PANTOPRAZOLE SODIUM 40 MG: 40 TABLET, DELAYED RELEASE ORAL at 08:43

## 2018-09-06 RX ADMIN — GABAPENTIN 900 MG: 300 CAPSULE ORAL at 01:15

## 2018-09-06 RX ADMIN — ACETAMINOPHEN 650 MG: 325 TABLET, FILM COATED ORAL at 17:38

## 2018-09-06 RX ADMIN — ROPINIROLE HYDROCHLORIDE 2 MG: 2 TABLET, FILM COATED ORAL at 19:30

## 2018-09-06 RX ADMIN — Medication 0.5 MG: at 17:38

## 2018-09-06 RX ADMIN — OXYCODONE HYDROCHLORIDE 10 MG: 5 TABLET ORAL at 19:30

## 2018-09-06 ASSESSMENT — ACTIVITIES OF DAILY LIVING (ADL)
ADLS_ACUITY_SCORE: 11
AMBULATION: 0-->INDEPENDENT
COGNITION: 0 - NO COGNITION ISSUES REPORTED
BATHING: 2 - ASSISTIVE PERSON
RETIRED_EATING: 0-->INDEPENDENT
ADLS_ACUITY_SCORE: 11
WHICH_OF_THE_ABOVE_FUNCTIONAL_RISKS_HAD_A_RECENT_ONSET_OR_CHANGE?: FALL HISTORY
ADLS_ACUITY_SCORE: 11
SWALLOWING: 0-->SWALLOWS FOODS/LIQUIDS WITHOUT DIFFICULTY
FALL_HISTORY_WITHIN_LAST_SIX_MONTHS: YES
TRANSFERRING: 2 - ASSISTIVE PERSON
EATING: 0 - INDEPENDENT
SWALLOWING: 0 - SWALLOWS FOODS/LIQUIDS WITHOUT DIFFICULTY
NUMBER_OF_TIMES_PATIENT_HAS_FALLEN_WITHIN_LAST_SIX_MONTHS: 1
DRESS: 0-->INDEPENDENT
TOILETING: 2 - ASSISTIVE PERSON
TRANSFERRING: 0-->INDEPENDENT
BATHING: 0-->INDEPENDENT
DRESS: 0 - INDEPENDENT
RETIRED_COMMUNICATION: 0-->UNDERSTANDS/COMMUNICATES WITHOUT DIFFICULTY
COMMUNICATION: 0 - UNDERSTANDS/COMMUNICATES WITHOUT DIFFICULTY
ADLS_ACUITY_SCORE: 11
AMBULATION: 2 - ASSISTIVE PERSON
TOILETING: 0-->INDEPENDENT

## 2018-09-06 NOTE — ED NOTES
Bed: ED01  Expected date: 9/5/18  Expected time:   Means of arrival: Ambulance  Comments:  Paulina Fonseca [phone] Recurrent Hip Dislocation. From [place]. Attempted conscious sedation in their ED without success.

## 2018-09-06 NOTE — PLAN OF CARE
Problem: Patient Care Overview  Goal: Individualization & Mutuality  Pt arrived to the unit  From ED at  Midnight  with left hip dislocation.  Pt alert and oriented, pt assisted to the bed. Pt oriented to room and call light system. VSS. Afebrile. 02 sats  In the  upper 90s on RA.Lungs clear. Denies SOB, CP and nausea. Tolerating regular diet. Bowel sound active in all quadrants. No BM but passing gas.  Pt came to the unit with lopez. Lopez removed at 0500. Pt voided and had BM.  Pain well managed with iv dilaudid and oxycodone. CMS intact, denies N/T. Baseline numbness to bilateral LEs mainly bilateral bid toe and some of the toes.  Pt slept between care and is able to make needs known, call light with in reach. Will continue to monitor.

## 2018-09-06 NOTE — H&P
[facility]  ORTHOPAEDIC SURGERY CONSULT - HISTORY AND PHYSICAL    DATE OF CONSULT: 9/5/2018 11:03 PM    REQUESTING PROVIDER: Kishore Oliveira DO, MD - N Staff.    CC: L hip pain    DATE OF INJURY: 9/5/18    HISTORY OF PRESENT ILLNESS:   Paulina Fonseca is a 48 year old year old female with PMH including Stickler's Syndrome, RLS, depression and neuropathy transferred from an OSH with L hip pain. Patient is well known to the Orthopaedic Surgery service for history of a L KAILEY dislocation on 8/10 requiring closed reduction in the OR the subsequent day. Initial dislocation was after a fall. Today, patient reports she was walking her dog when she bent over to get a beverage out of her bag. Immediate pain and inability to ambulate. Evaluated at OSH where a L hip dislocation was diagnosed. Attempt at closed reduction by ED was unsuccessful. Transferred for further management. Reports some increase in tingling to the dorsum of her L foot but does note that she has baseline neuropathy bilaterally. Denies pain in her hip over the past 1-2 weeks since she has recovered from her first dislocation.     Nursing and physician Emergency Department notes reviewed and HPI updated to reflect their ED course.     PAST MEDICAL HISTORY:   Stickler's Syndrome  RLS  Depression  Neuropathy  Patient denies any personal history of bleeding disorders, clotting disorders, or adverse reactions to anesthesia.    PAST SURGICAL HISTORY:   Bilateral TKA  Bilateral KAILEY  L TSA  Bilateral TAA  L hip closed reduction  Gastric bypass    MEDICATIONS:   Prior to Admission medications    Medication Sig Last Dose Taking? Auth Provider   acetaminophen (TYLENOL) 325 MG tablet Take 2 tablets (650 mg) by mouth every 4 hours as needed for other (multimodal surgical pain management along with NSAIDS and opioid medication as indicated based on pain control and physical function.) 9/5/2018 at Unknown time Yes Kosta Pastrana MD   DULOXETINE HCL  PO Take 60 mg by mouth daily 9/4/2018 at Unknown time Yes Unknown, Entered By History   GABAPENTIN PO Take 900 mg by mouth At Bedtime 9/4/2018 at Unknown time Yes Unknown, Entered By History   GABAPENTIN PO Take 600 mg by mouth 2 times daily AM and Lunch time 9/5/2018 at Unknown time Yes Unknown, Entered By History   Metoclopramide HCl (REGLAN PO) Take 10 mg by mouth 3 times daily (before meals) 9/4/2018 at Unknown time Yes Unknown, Entered By History   MODAFINIL PO Take 250 mg by mouth every morning 9/4/2018 at Unknown time Yes Unknown, Entered By History   Pantoprazole Sodium (PROTONIX PO) Take 40 mg by mouth every morning (before breakfast) 9/5/2018 at Unknown time Yes Unknown, Entered By History   ROPINIROLE HCL PO Take 2 mg by mouth 3 times daily 9/4/2018 at Unknown time Yes Unknown, Entered By History   TIZANIDINE HCL PO Take 4 mg by mouth every 8 hours as needed for muscle spasms 9/4/2018 at Unknown time Yes Unknown, Entered By History   oxyCODONE IR (ROXICODONE) 5 MG tablet Take 1-2 tablets (5-10 mg) by mouth every 4 hours as needed for other (pain control or improvement in physical function. Hold dose for analgesic side effects.)   Kosta Pastrana MD     ALLERGIES:   Codeine    SOCIAL HISTORY:   Living situation: Patient lives in an apartment in [place], [place] with her mom.   Occupation: Not currently working.   Tobacco: Denies.   Alcohol: Social.   Illicit Drugs: Denies.     FAMILY HISTORY:  Patient denies known family history of bleeding, clotting, or anesthesia related complications.     REVIEW OF SYSTEMS:   Otherwise, a 10-point reviews of systems was negative except as noted above in the HPI.     PHYSICAL EXAM:   Vitals:    09/05/18 2245 09/05/18 2249 09/05/18 2250 09/05/18 2251   BP: 112/78 99/66 108/67 116/76   Pulse:       Resp: 9 13 14 16   Temp:       TempSrc:       SpO2: 97% 90% 93% 95%   Weight:         General: Awake, alert, appropriate, following commands, NAD.  Neuro: CN II-XII grossly  intact.   Psych: Appropriate affect.   Skin: No rashes,  skin color normal.  HEENT: Normal.   Lungs: Breathing comfortably and nonlabored, no wheezes or stridor noted.  Heart/Cardiovascular: Regular pulse, no peripheral cyanosis.  Left Lower Extremity: Shortened and externally rotated. Skin intact. Previous surgical incisions well healed. No significant tenderness to palpation over distal thigh, knee, leg, ankle/foot. No pain with ROM ankle ROM. Did not attempt hip ROM d/t known injury. Motor intact distally TA/GSC/EHL/FHL with 5/5 strength. SILT sp/dp/tibial/saph/sural nerves - decreased in SP and DP but intact. DP/PT pulses palpable, 2+, toes warm and well perfused.     LABS: No new laboratory data to review.     IMAGING:    XR Pelvis/L Hip: Posterior superior KAILEY dislocation. No other acute bony abnormalities.     PROCEDURE: After discussion of the risks, benefits and alternatives, consent was obtained from the patient for conscious sedation and left hip reduction. Correct site and side verified with the patient. Neurovascular status checked pre procedure. CMS intact. Conscious sedation provided by ED colleagues. Gentle traction with flexion, adduction and internal rotation resulted in reduction. Neurovascular status checked post procedure. CMS intact. Patient tolerated the procedure well without any immediate complications.     ASSESSMENT AND PLAN:   Paulina Fonseca is a 48 year old year old female with PMH including Stickler's Syndrome, RLS, depression and neuropathy transferred from an OSH with a L KAILEY dislocation, now s/p closed reduction under conscious sedation in the ED on 9/5.    Admit to Orthopaedic Surgery.  Activity: Up with assist until independent. Posterior hip precautions.  Weight bearing status: WBAT.  Pain management: Transition from IV to PO as tolerated.    Antibiotics/Tetanus: No indication.   Diet: Regular diet.   Anticoagulation/DVT prophylaxis: SCDs.   Imaging: XR AP Pelvis post-reduction  ordered and reviewed.  Labs: PRN.  Bracing/Splinting: Hip abduction pillow at all times while in bed. May need to consider hip abduction brace.    Physical Therapy/Occupational Therapy: Eval and treat.  Follow-up: TBD.   Disposition: Pending pain control and progress with therapy - likely discharge to home in 1-2 days.     Case and plan discussed with Dr. Oliveira from the ED.     Assessment and Plan discussed with Dr. Mcdonough, Orthopaedic Surgery attending.     Tamara Dietrich MD  Orthopaedic Surgery Resident, PGY-4  Pager: [phone]     For questions about this patient, please attempt to contact me at my pager prior to contacting the Orthopaedic Surgery resident on call. Thank you!

## 2018-09-06 NOTE — PLAN OF CARE
Problem: Patient Care Overview  Goal: Plan of Care/Patient Progress Review  OT: Orders received and acknowledged. Following discussion with patient and PT, no skilled OT needs at this time. Did review post op precautions and importance of following, patient verbalized understanding. Has high toilet, walk in shower with shower chair and LH sponge. Patient ordered reacher and sock aide for home. No further OT needs.

## 2018-09-06 NOTE — ED NOTES
[facility], [place]   ED Nurse to Floor Handoff     Paulina Fonseca is a 48 year old female who speaks English and lives with family members,  in a home  They arrived in the ED by ambulance from [facility]    ED Chief Complaint: Hip Pain (Pt with hip dislocation. Attempt made at relocating at [facility], unsuccessful. )    ED Dx;   Final diagnoses:   Hip dislocation, left, initial encounter (H)         Needed?: No    Allergies:   Allergies   Allergen Reactions     Codeine GI Disturbance   .  Past Medical Hx: History reviewed. No pertinent past medical history.   Baseline Mental status: WDL  Current Mental Status changes: at basesline    Infection present or suspected this encounter: no  Sepsis suspected: No  Isolation type: No active isolations     Activity level - Baseline/Home:  Independent  Activity Level - Current:   Stand with Assist    Bariatric equipment needed?: No    In the ED these meds were given:   Medications   naloxone (NARCAN) injection 0.1-0.4 mg (not administered)   flumazenil (ROMAZICON) injection 0.2 mg (not administered)   lactated ringers BOLUS 1,000 mL (1,000 mLs Intravenous New Bag 9/5/18 2208)     Followed by   lactated ringers infusion (not administered)   HYDROmorphone (PF) (DILAUDID) injection 0.5 mg (0.5 mg Intravenous Given 9/5/18 2054)   nicotine (NICODERM CQ) 21 MG/24HR 24 hr patch 1 patch (1 patch Transdermal Given 9/5/18 2102)   propofol (DIPRIVAN) injection 10 mg/mL vial (40 mg Intravenous Given 9/5/18 2243)   HYDROmorphone (PF) (DILAUDID) injection 0.5 mg (0.5 mg Intravenous Given 9/5/18 2209)       Drips running?  Yes    Home pump  No    Current LDAs  Peripheral IV 09/05/18 Left (Active)   Site Assessment WDL 9/5/2018  8:40 PM   Line Status Saline locked 9/5/2018  8:40 PM   Number of days:0       Urethral Catheter (Active)   Number of days:0       Labs results: Labs Ordered and Resulted from Time of ED Arrival Up to the Time of  Departure from the ED - No data to display    Imaging Studies:   Recent Results (from the past 24 hour(s))   XR Pelvis and Hip Left 2 Views    Narrative    PELVIS AND HIP LEFT TWO VIEWS  9/5/2018 9:35 PM      HISTORY: Previous hip replacement. Concern for dislocation.      COMPARISON: None.      Impression    IMPRESSION: Superior dislocation of the left hip arthroplasty. No  fracture.    ANTOINETTE LENZ MD       Recent vital signs:   /68  Pulse 59  Temp 98.2  F (36.8  C) (Oral)  Resp 8  Wt 66.2 kg (146 lb)  SpO2 98%  Breastfeeding? No  BMI 27.95 kg/m2    Cardiac Rhythm: Normal Sinus  Pt needs tele? No  Skin/wound Issues: None    Code Status: none in file    Pain control: fair    Nausea control: pt had none    Abnormal labs/tests/findings requiring intervention:       Family present during ED course? No   Family Comments/Social Situation comments: none    Tasks needing completion: None    Fernanda Martinez RN  [facility]-- [phone] [facility]  [phone] [facility] ED

## 2018-09-06 NOTE — PROGRESS NOTES
09/06/18 1100   Quick Adds   Type of Visit Initial PT Evaluation      Language english   Living Environment   Lives With parent(s)   Living Arrangements apartment   Home Accessibility no concerns   Number of Stairs to Enter Home 0   Number of Stairs Within Home 0   Stair Railings at Home none   Transportation Available other (see comments)  (unclear at time of eval, likely by car)   Living Environment Comment lives with mother, able to help if needed   Self-Care   Dominant Hand right   Usual Activity Tolerance good   Current Activity Tolerance good   Equipment Currently Used at Home none   Functional Level Prior   Ambulation 0-->independent   Transferring 0-->independent   Toileting 0-->independent   Bathing 0-->independent   Dressing 0-->independent   Eating 0-->independent   Communication 0-->understands/communicates without difficulty   Swallowing 0-->swallows foods/liquids without difficulty   Cognition 0 - no cognition issues reported   Fall history within last six months yes   Number of times patient has fallen within last six months 1   Which of the above functional risks had a recent onset or change? fall history   Prior Functional Level Comment Pt was independent   General Information   Onset of Illness/Injury or Date of Surgery - Date 09/05/18   Referring Physician Tamara Dietrich MD   Patient/Family Goals Statement to return home   Pertinent History of Current Problem (include personal factors and/or comorbidities that impact the POC) left hip arthroplasty w/ posterior dislocation. Pt was hospitalized from 8/10/2018 to 8/12/2018 after a left prosthetic hip dislocation   Precautions/Limitations fall precautions;left hip precautions   Weight-Bearing Status - LUE full weight-bearing   Weight-Bearing Status - RUE full weight-bearing   Weight-Bearing Status - LLE full weight-bearing   Weight-Bearing Status - RLE full weight-bearing   General Observations supine in bed upon arrival, agreeable  to PT.    General Info Comments pt in hip abd brace while in bed.    Cognitive Status Examination   Orientation orientation to person, place and time   Level of Consciousness alert   Follows Commands and Answers Questions 100% of the time   Personal Safety and Judgment intact   Memory intact   Pain Assessment   Patient Currently in Pain Yes, see Vital Sign flowsheet   Integumentary/Edema   Integumentary/Edema no deficits were identifed   Posture    Posture Protracted shoulders;Forward head position;Kyphosis   Posture Comments mild with sitting   Range of Motion (ROM)   ROM Comment WFL within precautions LLE   Bed Mobility   Bed Mobility Bed mobility skill: Rolling/Turning;Bed mobility skill: Supine to sit   Bed Mobility Comments Ind w/ good adherence to precautions   Bed Mobility Skill: Rolling/Turning   Level of Tiff: Rolling/Turning independent   Bed Mobility Skill: Supine to Sit   Level of Tiff: Supine/Sit independent   Transfer Skills   Transfer Transfer Skill: Sit to Stand   Transfer Comments adherence to hip precutions, VC x1 for sit>stand   Transfer Skill:  Sit to Stand   Level of Tiff: Sit/Stand independent   Weightbearing Restrictions: Sit/Stand full weight-bearing   Gait   Gait Comments amb 250ft ind w/ no assistive device. Pt w/ slight antaglic gait (limb on L) due to pain. Pt steady, good gait speed and tolerance.   Balance   Balance Comments some lateral instability w/ gait due to L hip pain, no LOB.   Sensory Examination   Sensory Perception Comments WFL   Clinical Impression   Criteria for Skilled Therapeutic Intervention yes, treatment indicated   PT Diagnosis limited functional mobility   Influenced by the following impairments LLE pain, LLE strength deficits   Clinical Presentation Stable/Uncomplicated   Clinical Presentation Rationale (L hip dislocation, mobilizing appropriately)   Clinical Decision Making (Complexity) Low complexity   Therapy Frequency` (one time eval and  "treat)   Predicted Duration of Therapy Intervention (days/wks) 1   Anticipated Discharge Disposition Home with Assist  (from mother)   Risk & Benefits of therapy have been explained Yes   Patient, Family & other staff in agreement with plan of care Yes   [facility] AM-PAC  \"6 Clicks\" V.2 Basic Mobility Inpatient Short Form   1. Turning from your back to your side while in a flat bed without using bedrails? 4 - None   2. Moving from lying on your back to sitting on the side of a flat bed without using bedrails? 4 - None   3. Moving to and from a bed to a chair (including a wheelchair)? 4 - None   4. Standing up from a chair using your arms (e.g., wheelchair, or bedside chair)? 4 - None   5. To walk in hospital room? 4 - None   6. Climbing 3-5 steps with a railing? 4 - None   Basic Mobility Raw Score (Score out of 24.Lower scores equate to lower levels of function) 24   Total Evaluation Time   Total Evaluation Time (Minutes) 8     "

## 2018-09-06 NOTE — PROGRESS NOTES
Orthopaedic Surgery Progress Note   September 6, 2018    Subjective: No acute events overnight. Pain controlled. Tolerating diet. Voiding spontaneously. Denies n/t.     Objective: /60 (BP Location: Right arm)  Pulse 59  Temp 97.1  F (36.2  C) (Oral)  Resp 16  Wt 66.2 kg (146 lb)  SpO2 97%  Breastfeeding? No  BMI 27.95 kg/m2    General: NAD, alert and oriented, cooperative with exam.   Cardio: RRR, extremities wwp.   Respiratory: Non-labored breathing.  MSK: LLE: Toes wwp, DP 2+, bcr in all toes. +EHL/FHL/GSC/TA with 5/5 strength. SILT SP/DP/Sa/Ruff/T - decreased distal to ankle per patient's baseline neuropathy. Hip abduction pillow.     Assessment and Plan: Paulina Fonseca is a 48 year old year old female with PMH including Stickler's Syndrome, RLS, depression and neuropathy transferred from an OSH with a L KAILEY dislocation, now s/p closed reduction under conscious sedation in the ED on 9/5.     Orthopaedic Surgery Primary.  Activity: Up with assist until independent. Posterior hip precautions.  Weight bearing status: WBAT.  Pain management: Transition from IV to PO as tolerated.    Antibiotics/Tetanus: No indication.   Diet: Regular diet.   Anticoagulation/DVT prophylaxis: SCDs.   Imaging: No further imaging needed at this time.   Labs: PRN.  Bracing/Splinting: Hip abduction pillow at all times while in bed. May need to consider hip abduction brace.    Physical Therapy/Occupational Therapy: Eval and treat.  Follow-up: TBD.   Disposition: Pending pain control and progress with therapy - likely discharge to home today or tomorrow.     Tamara Dietrich MD  Orthopaedic Surgery Resident, PGY-4  Pager: [phone]    For questions about this patient during the day, please attempt to contact me at my pager ([phone]) prior to contacting the Orthopaedic Surgery resident on call. Thank you!

## 2018-09-06 NOTE — PLAN OF CARE
Problem: Pain, Acute (Adult)  Goal: Identify Related Risk Factors and Signs and Symptoms  Related risk factors and signs and symptoms are identified upon initiation of Human [employer] Clinical Practice Guideline (CPG).  Outcome: No Change  9/6 07:00-15:30    VS: stable   O2 Lungs CTA, denies chest painn/SOB  Sats 95 on room air   Output: Voiding spontaneously without difficulty per pt report   Bowels/BM BS+ all quadrants, + flatus  Last BM 9/6 AM   Activity Up with SBA assist   Skin: intact   Pain: Managed with prn oxycodone and prn tylenol.  Gabapentin scheduled.  Ice pack provided and refreshed through out the shift   CMS: intact   Dressing none   Diet: Regular, tolerated well   LDA: PIV left antecubital fossa, saline locked   Equipment: Abduction pillow, walker   Plan: Discharge today vs tomorrow   Additional Info: Reminders to maintain posterior hip precautions

## 2018-09-06 NOTE — ED NOTES
MD ortho at , getting consent for the conscious sedation. Ortho MD talked with pt thoroughly about the risks to benefits

## 2018-09-06 NOTE — PLAN OF CARE
Problem: Patient Care Overview  Goal: Plan of Care/Patient Progress Review  Discharge Planner PT   Patient plan for discharge: home w/ assist  Current status: pt supine>sit, sit<>stand and amb ind w/ good adherence to L hip precautions. Pt w/ antalgic gait on L due to recent dislocation however steady w/o assistive device. Anticipate pt will be safe to return home w/ assist of mother as needed.   Barriers to return to prior living situation: pain  Recommendations for discharge: home w/ assist  Rationale for recommendations: pt mobilizing independently and safely within L hip precautions.        Entered by: Hilda Pereira 09/06/2018 12:01 PM       Physical Therapy Discharge Summary    Reason for therapy discharge:    All goals and outcomes met, no further needs identified.    Progress towards therapy goal(s). See goals on Care Plan in [facility] electronic health record for goal details.  Goals met    Therapy recommendation(s):    No further therapy is recommended. Pt is independent w/ mobility w/ good adherence to L hip precautions. No DME recommendations.

## 2018-09-06 NOTE — ED PROVIDER NOTES
"    [facility] EMERGENCY DEPARTMENT ([facility])    9/05/18       History     Chief Complaint   Patient presents with     Hip Pain     Pt with hip dislocation. Attempt made at relocating at [facility] ED, unsuccessful.      The history is provided by the patient and medical records.     Paulina Fonseca is a 48 year old female with a medical history significant for left hip arthroplasty who was hospitalized from 8/10/2018 to 8/12/2018 after a left prosthetic hip dislocation.  Patient underwent surgical intervention, this was uncomplicated.  Patient today was bending over, grabbing something from her purse when she felt a \"clunk\" and knew something was wrong.  Patient called 911 and she was taken to [facility] Emergency Department; patient had an x-ray done that showed posterior dislocation of the hip arthroplasty.  They were unable to reduce the dislocation.  Patient was transferred here for further management and reduction.    I have reviewed the Medications, Allergies, Past Medical and Surgical History, and Social History in the Epic system.    History reviewed. No pertinent past medical history.    Past Surgical History:   Procedure Laterality Date     CLOSED REDUCTION HIP Left 8/11/2018    Procedure: CLOSED REDUCTION HIP;  Left Total Hip Arthroplasty Closed Reduction;  Surgeon: Kosta Pastrana MD;  Location: [facility] OR       No family history on file.    Social History   Substance Use Topics     Smoking status: Current Every Day Smoker     Packs/day: 1.00     Smokeless tobacco: Never Used     Alcohol use Yes      Comment: denies recent use       No current facility-administered medications for this encounter.      Current Outpatient Prescriptions   Medication     acetaminophen (TYLENOL) 325 MG tablet     DULOXETINE HCL PO     GABAPENTIN PO     GABAPENTIN PO     Metoclopramide HCl (REGLAN PO)     MODAFINIL PO     Pantoprazole Sodium (PROTONIX PO)     ROPINIROLE HCL PO     TIZANIDINE HCL PO     " oxyCODONE IR (ROXICODONE) 5 MG tablet        Allergies   Allergen Reactions     Codeine GI Disturbance         Review of Systems   Constitutional: Negative for fever.   HENT: Negative for congestion.    Eyes: Negative for redness.   Respiratory: Negative for shortness of breath.    Cardiovascular: Negative for chest pain.   Gastrointestinal: Negative for abdominal pain.   Genitourinary: Negative for difficulty urinating.   Musculoskeletal: Negative for arthralgias and neck stiffness.        Positive for left hip pain   Skin: Negative for color change.   Neurological: Negative for headaches.   Psychiatric/Behavioral: Negative for confusion.   All other systems reviewed and are negative.      Physical Exam   BP: 133/78  Pulse: 59  Temp: 98.2  F (36.8  C)  Resp: 16  Weight: 66.2 kg (146 lb)  SpO2: 98 %      Physical Exam   Constitutional: She is oriented to person, place, and time. She appears well-developed and well-nourished. She appears distressed.   Tearful   HENT:   Head: Normocephalic and atraumatic.   Mouth/Throat: No oropharyngeal exudate.   Eyes: Pupils are equal, round, and reactive to light. Right eye exhibits no discharge. Left eye exhibits no discharge. No scleral icterus.   Right cornea is opacified.   Neck: Normal range of motion. Neck supple.   Cardiovascular: Normal rate, regular rhythm, normal heart sounds and intact distal pulses.  Exam reveals no gallop and no friction rub.    No murmur heard.  Pulmonary/Chest: Effort normal and breath sounds normal. No respiratory distress. She has no wheezes. She exhibits no tenderness.   Abdominal: Soft. Bowel sounds are normal. She exhibits no distension. There is no tenderness.   Musculoskeletal: Normal range of motion. She exhibits tenderness. She exhibits no edema.   Tenderness at left hip.  Distal pulses are intact.   Neurological: She is alert and oriented to person, place, and time. No cranial nerve deficit.   Skin: Skin is warm and dry. No rash noted. She  is not diaphoretic. No erythema. No pallor.   Psychiatric: She has a normal mood and affect.       ED Course   8:37 PM  The patient was seen and examined by Kishore Oliveira DO in Room ED01.     ED Course     Procedures         [facility] Procedure Note        Sedation:      Performed by: Kishore Oliveira  Authorized by: Kishore Oliveira    Pre-Procedure Assessment done at 2200.    Expected Level:  Moderate Sedation    Indication:  Sedation is required to allow for joint reduction    Consent obtained from patient after discussing the risks, benefits and alternatives.    PO Intake:  Appropriately NPO for procedure    ASA Class:  Class 2 - MILD SYSTEMIC DISEASE, NO ACUTE PROBLEMS, NO FUNCTIONAL LIMITATIONS.    Mallampati:  Grade 2:  Soft palate, base of uvula, tonsillar pillars, and portion of posterior pharyngeal wall visible    Lungs: Lungs Clear with good breath sounds bilaterally.     Heart: Normal heart sounds and rate    History and physical reviewed and no updates needed. I have reviewed the lab findings, diagnostic data, medications, and the plan for sedation. I have determined this patient to be an appropriate candidate for the planned sedation and procedure and have reassessed the patient IMMEDIATELY PRIOR to sedation and procedure.      Sedation Post Procedure Summary:    Prior to the start of the procedure and with procedural staff participation, I verbally confirmed the patient s identity using two indicators, relevant allergies, that the procedure was appropriate and matched the consent or emergent situation, and that the correct equipment/implants were available. Immediately prior to starting the procedure I conducted the Time Out with the procedural staff and re-confirmed the patient s name, procedure, and site/side. (The Joint Commission universal protocol was followed.)  Yes      Sedatives: Propofol    Vital signs, airway, End Tidal CO2 and pulse oximetry were monitored and remained stable  throughout the procedure and sedation was maintained until the procedure was complete.  The patient was monitored by staff until sedation discharge criteria were met.    Patient tolerance: Patient tolerated the procedure well with no immediate complications.    Time of sedation in minutes:  5 minutes from beginning to end of physician one to one monitoring.    Results for orders placed or performed during the hospital encounter of 09/05/18   XR Pelvis and Hip Left 2 Views    Narrative    PELVIS AND HIP LEFT TWO VIEWS  9/5/2018 9:35 PM      HISTORY: Previous hip replacement. Concern for dislocation.      COMPARISON: None.      Impression    IMPRESSION: Superior dislocation of the left hip arthroplasty. No  fracture.    ANTOINETTE LENZ MD   XR Pelvis w Hip Port Left 1 View    Narrative    PELVIS SINGLE VIEW PORTABLE  9/5/2018 11:01 PM     HISTORY: Left hip dislocation status post reduction.    COMPARISON: 9/5/2018 at 2124 hours.      Impression    IMPRESSION:  1. Interval reduction of what was a dislocated left hip arthroplasty.  2. No visualized acute fracture in the pelvis.    FANG LOZADA MD          Critical Care time:  none             Labs Ordered and Resulted from Time of ED Arrival Up to the Time of Departure from the ED - No data to display         Assessments & Plan (with Medical Decision Making)   This is a 48-year-old female with a prosthetic left hip dislocation.  Patient had a similar occurrence 3 weeks ago and was transferred to this facility for reduction in the OR.  Patient had a similar occurrence today.  Reduction was attempted in the [facility] emergency department but was unsuccessful.  Patient was transferred to this facility.  This facility patient underwent procedural sedation and had a successful reduction done in the emergency department by orthopedics.  They will admit the patient.      I have reviewed the nursing notes.    I have reviewed the findings, diagnosis, plan and need for follow up  with the patient.    New Prescriptions    No medications on file       Final diagnoses:   None     IWes, am serving as a trained medical scribe to document services personally performed by Kishore Oliveira DO, based on the provider's statements to me.   IKishore DO, was physically present and have reviewed and verified the accuracy of this note documented by Wes Desir.    9/5/2018   [facility], [place], EMERGENCY DEPARTMENT     Kishore Oliveira DO  09/06/18 0120

## 2018-09-07 VITALS
BODY MASS INDEX: 27.95 KG/M2 | SYSTOLIC BLOOD PRESSURE: 118 MMHG | WEIGHT: 146 LBS | DIASTOLIC BLOOD PRESSURE: 78 MMHG | RESPIRATION RATE: 16 BRPM | TEMPERATURE: 98.9 F | OXYGEN SATURATION: 95 % | HEART RATE: 83 BPM

## 2018-09-07 PROCEDURE — 25000132 ZZH RX MED GY IP 250 OP 250 PS 637: Performed by: STUDENT IN AN ORGANIZED HEALTH CARE EDUCATION/TRAINING PROGRAM

## 2018-09-07 PROCEDURE — 25000128 H RX IP 250 OP 636: Performed by: STUDENT IN AN ORGANIZED HEALTH CARE EDUCATION/TRAINING PROGRAM

## 2018-09-07 RX ADMIN — OXYCODONE HYDROCHLORIDE 10 MG: 5 TABLET ORAL at 11:32

## 2018-09-07 RX ADMIN — OXYCODONE HYDROCHLORIDE 10 MG: 5 TABLET ORAL at 05:35

## 2018-09-07 RX ADMIN — ACETAMINOPHEN 650 MG: 325 TABLET, FILM COATED ORAL at 14:32

## 2018-09-07 RX ADMIN — OXYCODONE HYDROCHLORIDE 10 MG: 5 TABLET ORAL at 14:32

## 2018-09-07 RX ADMIN — Medication 1 MG: at 02:01

## 2018-09-07 RX ADMIN — GABAPENTIN 600 MG: 600 TABLET, FILM COATED ORAL at 11:32

## 2018-09-07 RX ADMIN — ACETAMINOPHEN 650 MG: 325 TABLET, FILM COATED ORAL at 02:15

## 2018-09-07 RX ADMIN — OXYCODONE HYDROCHLORIDE 10 MG: 5 TABLET ORAL at 02:01

## 2018-09-07 RX ADMIN — Medication 0.5 MG: at 07:12

## 2018-09-07 RX ADMIN — GABAPENTIN 600 MG: 600 TABLET, FILM COATED ORAL at 08:33

## 2018-09-07 RX ADMIN — ACETAMINOPHEN 650 MG: 325 TABLET, FILM COATED ORAL at 20:28

## 2018-09-07 RX ADMIN — Medication 0.5 MG: at 00:02

## 2018-09-07 RX ADMIN — OXYCODONE HYDROCHLORIDE 10 MG: 5 TABLET ORAL at 08:33

## 2018-09-07 RX ADMIN — OXYCODONE HYDROCHLORIDE 10 MG: 5 TABLET ORAL at 20:28

## 2018-09-07 RX ADMIN — PANTOPRAZOLE SODIUM 40 MG: 40 TABLET, DELAYED RELEASE ORAL at 08:33

## 2018-09-07 RX ADMIN — OXYCODONE HYDROCHLORIDE 10 MG: 5 TABLET ORAL at 17:31

## 2018-09-07 RX ADMIN — ARMODAFINIL 250 MG: 250 TABLET ORAL at 08:33

## 2018-09-07 RX ADMIN — ACETAMINOPHEN 650 MG: 325 TABLET, FILM COATED ORAL at 07:12

## 2018-09-07 ASSESSMENT — ACTIVITIES OF DAILY LIVING (ADL)
ADLS_ACUITY_SCORE: 11

## 2018-09-07 ASSESSMENT — PAIN DESCRIPTION - DESCRIPTORS: DESCRIPTORS: ACHING

## 2018-09-07 NOTE — PLAN OF CARE
Problem: Pain, Acute (Adult)  Goal: Identify Related Risk Factors and Signs and Symptoms  Related risk factors and signs and symptoms are identified upon initiation of Human Response Clinical Practice Guideline (CPG).   Outcome: Improving    VS: VSS   O2: Sats >90% on RA   Output: Urine output adequate    Last BM: 9/6   Activity: Up independently    Skin: Intact - incision    Pain: Managed with 10mg Oxycodone q3 hours and ice packs   CMS: Intact - baseline neuropathy BLE   Dressing: UTV   Diet: Regular, well tolerated. No N/V   LDA: PIV removed    Equipment: None   Plan: DC at 9pm via taxi to [facility]    Additional Info:

## 2018-09-07 NOTE — PROGRESS NOTES
Orthopaedic Surgery Progress Note   September 7, 2018    Subjective: No acute events overnight. Pain controlled. Tolerating diet. Voiding spontaneously. Working with PT. Awaiting arrangement of ride for discharge.     Objective: BP 93/55 (BP Location: Right arm)  Pulse 59  Temp 96.7  F (35.9  C) (Oral)  Resp 16  Wt 66.2 kg (146 lb)  SpO2 96%  Breastfeeding? No  BMI 27.95 kg/m2    General: NAD, alert and oriented, cooperative with exam.   Cardio: RRR, extremities wwp.   Respiratory: Non-labored breathing.  MSK: LLE: Toes wwp, DP 2+, bcr in all toes. +EHL/FHL/GSC/TA with 5/5 strength. SILT SP/DP/Sa/Ruff/T - decreased distal to ankle per patient's baseline neuropathy. Hip abduction pillow.     Assessment and Plan: Paulina Fonseca is a 48 year old year old female with PMH including Stickler's Syndrome, RLS, depression and neuropathy transferred from an OSH with a L KAILEY dislocation, now s/p closed reduction under conscious sedation in the ED on 9/5.     Orthopaedic Surgery Primary.  Activity: Up with assist until independent. Posterior hip precautions.  Weight bearing status: WBAT.  Pain management: Transition from IV to PO as tolerated.    Antibiotics/Tetanus: No indication.   Diet: Regular diet.   Anticoagulation/DVT prophylaxis: SCDs.   Imaging: No further imaging needed at this time.   Labs: PRN.  Bracing/Splinting: Hip abduction pillow at all times while in bed. Patient is not interested in a hip abduction brace.   Physical Therapy/Occupational Therapy: Eval and treat.  Follow-up: PRN.   Disposition: Pending arrangement of a ride - likely discharge home today.     Tamara Dietrich MD  Orthopaedic Surgery Resident, PGY-4  Pager: [phone]    For questions about this patient during the day, please attempt to contact me at my pager ([phone]) prior to contacting the Orthopaedic Surgery resident on call. Thank you!

## 2018-09-07 NOTE — PLAN OF CARE
Problem: Pain, Acute (Adult)  Goal: Identify Related Risk Factors and Signs and Symptoms  Related risk factors and signs and symptoms are identified upon initiation of Human Response Clinical Practice Guideline (CPG).   Outcome: Improving    VS: /69 (BP Location: Right arm)  Pulse 59  Temp 98.5  F (36.9  C) (Oral)  Resp 16  Wt 66.2 kg (146 lb)  SpO2 95%  Breastfeeding? No  BMI 27.95 kg/m2   O2: RA.   Output: Voiding in bathroom.   Last BM: 9/6/18.   Activity: SBA with walker.   Skin: Intact.   Pain: Constant aching in left hip and groin managed with tylenol 650 mg q4h last given at 2232, IV dilaudid 0.3-0.5 mg q6h last given at 1738, oxycodone 5-10 mg q3h last given at 2232, and ice.   CMS: Baseline neuropathy BLE.   Dressing: NA.   Diet: Regular.   LDA: PIV left arm SL.   Equipment: Walker, abductor pillow, call light within reach.   Plan: Continue to monitor and manage pain. Anticipate discharge tomorrow.   Additional Info:

## 2018-09-07 NOTE — PLAN OF CARE
Problem: Patient Care Overview  Goal: Plan of Care/Patient Progress Review  Outcome: Improving  Patient A/Ox4. VSS. Denies CP, SOB, dizziness/LH. LSCTA. +fl/BS. Voiding spontaneously in the BR. Baseline neuropathy BLE. Hip precaution maintained.. Tolerating regular diet .Activity as tolerated with hip precaution. IV SL. Pain rated tolerable throughout shift, managed with Roxicodone 10 mg po . Patient has demonstrated ability to call appropriately. Patient is resting with call light within reach. Will continue to monitor. See CC notes for discharge ride arrangement.

## 2018-09-07 NOTE — PLAN OF CARE
Problem: Patient Care Overview  Goal: Plan of Care/Patient Progress Review  Outcome: No Change    VS: BP 93/55 after IV dilaudid administration   O2: Maintained on RA   Output: Voiding in bathroom   Last BM: 9/6   Activity: Independent in room with walker   Skin:    Pain: Managed with oral and IV opioids.   CMS: Baseline numbness and tingling   Dressing: N/A   Diet: Regular   LDA: PIV   Equipment: Walker   Plan:    Additional Info: Patient is overwhelmed with managing caring for herself as well as her mom. Offered emotional support and encouragement. Offered patient essential oils, CARE channel, patient declined. Asked patient if she would like to speak with a  and patient declined. Discussed ways patient can take care of herself at home.

## 2018-09-07 NOTE — DISCHARGE SUMMARY
ORTHOPAEDIC SURGERY DISCHARGE SUMMARY     Date of Admission: 9/5/2018  Date of Discharge: 9/7/2018  Disposition: Home  Staff Physician: Mary Kate Mcdonough, *  Primary Care Provider: Sanjuanita Terry    DISCHARGE DIAGNOSIS:  Left total hip arthroplasty dislocation, recurrent    PROCEDURES: Left total hip arthroplasty closed reduction under conscious sedation in the ED on 9/5/18    BRIEF HISTORY:  Paulina Fonseca is a 48 year old year old female with PMH including Stickler's Syndrome, RLS, depression and neuropathy transferred from an OSH with L hip pain. Patient is well known to the Orthopaedic Surgery service for history of a L KAILEY dislocation on 8/10 requiring closed reduction in the OR the subsequent day. Initial dislocation was after a fall. Today, patient reports she was walking her dog when she bent over to get a beverage out of her bag. Immediate pain and inability to ambulate. Evaluated at OSH where a L hip dislocation was diagnosed. Attempt at closed reduction by ED was unsuccessful. Transferred for further management. Reports some increase in tingling to the dorsum of her L foot but does note that she has baseline neuropathy bilaterally. Denies pain in her hip over the past 1-2 weeks since she has recovered from her first dislocation.     HOSPITAL COURSE:    The patient was admitted following the above listed procedure for pain control and rehabilitation. Paulina Fonseca did well. The patient received routine nursing cares and at the time of discharge was medically stable. Vital signs were stable throughout admission. The patient is tolerating a regular diet and is voiding spontaneously. All PT/OT goals have been met for safe mobility. Pain is now controlled on oral medications which will be available on discharge. Stool softeners have been used while taking pain medications to help prevent constipation. Paulina Fonseca is deemed medically safe to discharge.     PHYSICAL EXAM:    General: NAD,  alert and oriented, cooperative with exam.   Cardio: RRR, extremities wwp.   Respiratory: Non-labored breathing.  MSK: LLE: Toes wwp, DP 2+, bcr in all toes. +EHL/FHL/GSC/TA with 5/5 strength. SILT SP/DP/Sa/Ruff/T - decreased distal to ankle per patient's baseline neuropathy. Hip abduction pillow.     FOLLOWUP:    Follow up with Orthopaedic Surgery prn.     Orthopaedic Surgery appointments are at the [facility] ([address] [place] [address]). Call [phone] to schedule a follow-up appointment at this location with your provider.     PLANNED DISCHARGE ORDERS:      Current Discharge Medication List      CONTINUE these medications which have CHANGED    Details   oxyCODONE IR (ROXICODONE) 5 MG tablet Take 1-2 tablets (5-10 mg) by mouth every 4 hours as needed for other (pain control or improvement in physical function. Hold dose for analgesic side effects.)  Qty: 30 tablet, Refills: 0    Associated Diagnoses: Hip dislocation, left, initial encounter (H)         CONTINUE these medications which have NOT CHANGED    Details   acetaminophen (TYLENOL) 325 MG tablet Take 2 tablets (650 mg) by mouth every 4 hours as needed for other (multimodal surgical pain management along with NSAIDS and opioid medication as indicated based on pain control and physical function.)  Qty: 100 tablet, Refills: 0    Associated Diagnoses: Dislocation of left hip, initial encounter (H)      armodafinil (NUVIGIL) 250 MG TABS tablet Take 250 mg by mouth every morning      DULOXETINE HCL PO Take 60 mg by mouth daily      gabapentin (NEURONTIN) 300 MG capsule Take 900 mg by mouth 3 times daily      Metoclopramide HCl (REGLAN PO) Take 10 mg by mouth 3 times daily (before meals)      Pantoprazole Sodium (PROTONIX PO) Take 40 mg by mouth every morning (before breakfast)      ROPINIROLE HCL PO Take 2 mg by mouth 3 times daily      TIZANIDINE HCL PO Take 4 mg by mouth every 8 hours as needed for muscle spasms          STOP taking these medications       MODAFINIL PO Comments:   Reason for Stopping:                 Discharge Procedure Orders  Reason for your hospital stay   Order Comments: You were hospitalized for pain control and therapy after hip dislocation.     [facility] Follow-up and recommended labs and tests   Order Comments: Follow-up with Orthopaedic Surgery - Dr. Pastrana - as needed.     [facility] ([address] [place] [address]). Call [phone] to schedule a follow-up appointment at this location with your provider.     Activity   Order Comments: Weight bearing as tolerated. Posterior hip precautions.   Order Specific Question Answer Comments   Is discharge order? Yes      Full Code     Diet   Order Comments: Follow this diet upon discharge: Orders Placed This Encounter     Combination Diet Regular Diet Adult   Order Specific Question Answer Comments   Is discharge order? Yes          Tamara Dietrich MD  Orthopaedic Surgery Resident, PGY-4  Pager: [phone]

## 2018-09-08 NOTE — PLAN OF CARE
Problem: Patient Care Overview  Goal: Plan of Care/Patient Progress Review    VS: stable   O2: sats stable above 90%   Output: adequate   Last BM: 9/7   Activity: Up ad sonal with no equipment   Skin: intact   Pain: Tolerable and managed with 10mg oxycodone q3h   CMS: Intact except baseline numbness   Dressing:    Diet: regular   LDA: none   Equipment: reacher   Plan:    Additional Info: Pt appropriate for discharge.Pt given discharge instructions and medications and verbalized understanding.Pt discharged to home in stable condition @ 2050.[facility]

## 2018-09-10 ENCOUNTER — PATIENT OUTREACH (OUTPATIENT)
Dept: CARE COORDINATION | Facility: CLINIC | Age: 48
End: 2018-09-10

## 2018-09-12 ENCOUNTER — HEALTH MAINTENANCE LETTER (OUTPATIENT)
Age: 48
End: 2018-09-12
